# Patient Record
Sex: MALE | Race: WHITE | NOT HISPANIC OR LATINO | Employment: FULL TIME | URBAN - METROPOLITAN AREA
[De-identification: names, ages, dates, MRNs, and addresses within clinical notes are randomized per-mention and may not be internally consistent; named-entity substitution may affect disease eponyms.]

---

## 2017-03-01 RX ORDER — AMLODIPINE BESYLATE 5 MG/1
TABLET ORAL
Qty: 90 TAB | Refills: 1 | Status: SHIPPED | OUTPATIENT
Start: 2017-03-01 | End: 2017-09-03 | Stop reason: SDUPTHER

## 2017-03-01 RX ORDER — LISINOPRIL AND HYDROCHLOROTHIAZIDE 25; 20 MG/1; MG/1
TABLET ORAL
Qty: 90 TAB | Refills: 1 | Status: SHIPPED | OUTPATIENT
Start: 2017-03-01 | End: 2017-09-03 | Stop reason: SDUPTHER

## 2017-03-01 NOTE — TELEPHONE ENCOUNTER
Was the patient seen in the last year in this department? Yes     Does patient have an active prescription for medications requested? No     Received Request Via: Pharmacy      Pt met protocol?: Yes, last ov 10/21/16,   BP Readings from Last 1 Encounters:   10/21/16 118/82

## 2017-04-11 ENCOUNTER — OFFICE VISIT (OUTPATIENT)
Dept: URGENT CARE | Facility: PHYSICIAN GROUP | Age: 52
End: 2017-04-11
Payer: COMMERCIAL

## 2017-04-11 VITALS
HEART RATE: 99 BPM | SYSTOLIC BLOOD PRESSURE: 106 MMHG | BODY MASS INDEX: 32.6 KG/M2 | RESPIRATION RATE: 16 BRPM | WEIGHT: 254 LBS | DIASTOLIC BLOOD PRESSURE: 74 MMHG | OXYGEN SATURATION: 94 % | TEMPERATURE: 98.4 F | HEIGHT: 74 IN

## 2017-04-11 DIAGNOSIS — J02.9 EXUDATIVE PHARYNGITIS: Primary | ICD-10-CM

## 2017-04-11 LAB
INT CON NEG: NEGATIVE
INT CON POS: POSITIVE
S PYO AG THROAT QL: NEGATIVE

## 2017-04-11 PROCEDURE — 87880 STREP A ASSAY W/OPTIC: CPT | Performed by: PHYSICIAN ASSISTANT

## 2017-04-11 PROCEDURE — 99214 OFFICE O/P EST MOD 30 MIN: CPT | Performed by: PHYSICIAN ASSISTANT

## 2017-04-11 RX ORDER — AMOXICILLIN AND CLAVULANATE POTASSIUM 875; 125 MG/1; MG/1
1 TABLET, FILM COATED ORAL 2 TIMES DAILY
Qty: 20 TAB | Refills: 0 | Status: SHIPPED | OUTPATIENT
Start: 2017-04-11 | End: 2017-04-21

## 2017-04-11 NOTE — PATIENT INSTRUCTIONS
"Strep Throat  Strep throat is an infection of the throat caused by a bacteria named Streptococcus pyogenes. Your health care provider may call the infection streptococcal \"tonsillitis\" or \"pharyngitis\" depending on whether there are signs of inflammation in the tonsils or back of the throat. Strep throat is most common in children aged 5-15 years during the cold months of the year, but it can occur in people of any age during any season. This infection is spread from person to person (contagious) through coughing, sneezing, or other close contact.  SIGNS AND SYMPTOMS   · Fever or chills.  · Painful, swollen, red tonsils or throat.  · Pain or difficulty when swallowing.  · White or yellow spots on the tonsils or throat.  · Swollen, tender lymph nodes or \"glands\" of the neck or under the jaw.  · Red rash all over the body (rare).  DIAGNOSIS   Many different infections can cause the same symptoms. A test must be done to confirm the diagnosis so the right treatment can be given. A \"rapid strep test\" can help your health care provider make the diagnosis in a few minutes. If this test is not available, a light swab of the infected area can be used for a throat culture test. If a throat culture test is done, results are usually available in a day or two.  TREATMENT   Strep throat is treated with antibiotic medicine.  HOME CARE INSTRUCTIONS   · Gargle with 1 tsp of salt in 1 cup of warm water, 3-4 times per day or as needed for comfort.  · Family members who also have a sore throat or fever should be tested for strep throat and treated with antibiotics if they have the strep infection.  · Make sure everyone in your household washes their hands well.  · Do not share food, drinking cups, or personal items that could cause the infection to spread to others.  · You may need to eat a soft food diet until your sore throat gets better.  · Drink enough water and fluids to keep your urine clear or pale yellow. This will help prevent " dehydration.  · Get plenty of rest.  · Stay home from school, day care, or work until you have been on antibiotics for 24 hours.  · Take medicines only as directed by your health care provider.  · Take your antibiotic medicine as directed by your health care provider. Finish it even if you start to feel better.  SEEK MEDICAL CARE IF:   · The glands in your neck continue to enlarge.  · You develop a rash, cough, or earache.  · You cough up green, yellow-brown, or bloody sputum.  · You have pain or discomfort not controlled by medicines.  · Your problems seem to be getting worse rather than better.  · You have a fever.  SEEK IMMEDIATE MEDICAL CARE IF:   · You develop any new symptoms such as vomiting, severe headache, stiff or painful neck, chest pain, shortness of breath, or trouble swallowing.  · You develop severe throat pain, drooling, or changes in your voice.  · You develop swelling of the neck, or the skin on the neck becomes red and tender.  · You develop signs of dehydration, such as fatigue, dry mouth, and decreased urination.  · You become increasingly sleepy, or you cannot wake up completely.  MAKE SURE YOU:  · Understand these instructions.  · Will watch your condition.  · Will get help right away if you are not doing well or get worse.     This information is not intended to replace advice given to you by your health care provider. Make sure you discuss any questions you have with your health care provider.     Document Released: 12/15/2001 Document Revised: 01/08/2016 Document Reviewed: 04/11/2016  [employer] Interactive Patient Education ©2016 [employer]

## 2017-04-11 NOTE — PROGRESS NOTES
Subjective:      Pt is a 51 y.o. male who presents with Sore Throat            HPI  PT presents to  clinic today complaining of sore throat, fevers, chills, watery eyes, pressure in ears, cough, fatigue, runny nose. PT denies CP, SOB, NVD, abdominal pain, joint pain. PT states these symptoms began around 2 days ago and that the pt's family has been sick on and off for the last week. Pt has not taken any medications for this condition. PT states the pain is a 7/10 with swallowing, aching in nature and worse at night. The pt's medication list, problem list, and allergies have been evaluated and reviewed during today's visit.      PMH:  Past Medical History   Diagnosis Date   • Allergy    • Anxiety    • GERD (gastroesophageal reflux disease)    • Hypertension    • Hyperlipidemia        PSH:  Past Surgical History   Procedure Laterality Date   • Testicle exploration       undescended testicle, Age 7   • Hernia repair       umbilical hernia as a child       Fam Hx:    family history includes Cancer in his maternal uncle; Diabetes in his mother; Heart Disease in his father, maternal grandfather, mother, and paternal grandfather; Hyperlipidemia in his father, maternal grandfather, mother, and paternal grandfather; Hypertension in his brother, brother, father, maternal grandfather, mother, paternal grandfather, and sister; Lung Disease in his mother and paternal uncle; Psychiatry in his father and paternal grandmother; Stroke in his maternal grandfather.  Family Status   Relation Status Death Age   • Mother  81   • Father  85   • Brother Alive    • Maternal Uncle     • Paternal Uncle     • Maternal Grandfather     • Paternal Grandmother     • Paternal Grandfather     • Sister Alive    • Maternal Aunt Alive    • Maternal Grandmother     • Brother Alive    • Brother Alive        Soc HX:  Social History     Social History   • Marital Status: Single     Spouse  Name: N/A   • Number of Children: N/A   • Years of Education: N/A     Occupational History   •       Social History Main Topics   • Smoking status: Former Smoker -- 0.50 packs/day for 15 years     Types: Cigarettes     Quit date: 01/01/2001   • Smokeless tobacco: Never Used      Comment: avoid all tobacco products   • Alcohol Use: 1.2 oz/week     2 Cans of beer per week      Comment: Cut back, 1-2 beers a week.   • Drug Use: No   • Sexual Activity:     Partners: Female     Birth Control/ Protection: Post-Menopausal      Comment:      Other Topics Concern   • Not on file     Social History Narrative         Medications:    Current outpatient prescriptions:   •  Ibuprofen (ADVIL PO), Take  by mouth., Disp: , Rfl:   •  amoxicillin-clavulanate (AUGMENTIN) 875-125 MG Tab, Take 1 Tab by mouth 2 times a day for 10 days., Disp: 20 Tab, Rfl: 0  •  amlodipine (NORVASC) 5 MG Tab, TAKE ONE TABLET BY MOUTH DAILY, Disp: 90 Tab, Rfl: 1  •  lisinopril-hydrochlorothiazide (PRINZIDE, ZESTORETIC) 20-25 MG per tablet, TAKE ONE TABLET BY MOUTH DAILY, Disp: 90 Tab, Rfl: 1  •  ibuprofen (MOTRIN) 200 MG Tab, Take 200 mg by mouth every 6 hours as needed., Disp: , Rfl:       Allergies:  Review of patient's allergies indicates no known allergies.      ROS  Constitutional: Positive for chills and malaise/fatigue.   HENT: Positive for congestion and sore throat. Negative for ear pain.    Eyes: Negative for blurred vision, double vision and photophobia.   Respiratory: Positive for cough and sputum production. Negative for hemoptysis, shortness of breath and wheezing.    Cardiovascular: Negative for chest pain and palpitations.   Gastrointestinal: Negative for nausea, vomiting, abdominal pain, diarrhea and constipation.   Genitourinary: Negative for dysuria and flank pain.   Musculoskeletal: Negative for falls and myalgias.   Skin: Negative for itching and rash.   Neurological: Positive for headaches. Negative for dizziness  "and tingling.   Endo/Heme/Allergies: Does not bruise/bleed easily.   Psychiatric/Behavioral: Negative for depression. The patient is not nervous/anxious.    All other systems reviewed and are negative.         Objective:     /74 mmHg  Pulse 99  Temp(Src) 36.9 °C (98.4 °F)  Resp 16  Ht 1.88 m (6' 2.02\")  Wt 115.214 kg (254 lb)  BMI 32.60 kg/m2  SpO2 94%     Physical Exam       Constitutional: PT is oriented to person, place, and time. PT appears well-developed and well-nourished. No distress.   HENT:   Head: Normocephalic and atraumatic.   Right Ear: Hearing, tympanic membrane, external ear and ear canal normal.   Left Ear: Hearing, tympanic membrane, external ear and ear canal normal.   Nose: Mucosal edema, rhinorrhea and sinus tenderness present. Right sinus exhibits frontal sinus tenderness. Left sinus exhibits frontal sinus tenderness.   Mouth/Throat: Uvula is midline. Mucous membranes are pale. Posterior oropharyngeal edema and posterior oropharyngeal erythema with exudate on exam.   Eyes: Conjunctivae normal and EOM are normal. Pupils are equal, round, and reactive to light.   Neck: Normal range of motion. Neck supple. No thyromegaly present.   Cardiovascular: Normal rate, regular rhythm, normal heart sounds and intact distal pulses.  Exam reveals no gallop and no friction rub.    No murmur heard.  Pulmonary/Chest: Effort normal and breath sounds normal. No respiratory distress. PT has no wheezes. PT has no rales. PT exhibits no tenderness.   Abdominal: Soft. Bowel sounds are normal. PT exhibits no distension and no mass. There is no tenderness. There is no rebound and no guarding.   Musculoskeletal: Normal range of motion. PT exhibits no edema and no tenderness.   Lymphadenopathy:     PT has no cervical adenopathy.   Neurological: PT is alert and oriented to person, place, and time. PT displays normal reflexes. No cranial nerve deficit. PT exhibits normal muscle tone. Coordination normal.   Skin: " Skin is warm and dry. No rash noted. No erythema.   Psychiatric: PT has a normal mood and affect. PT behavior is normal. Judgment and thought content normal.        Assessment/Plan:     1. Exudative pharyngitis  Back-up abx if symptoms do not improve in 2-3 days. PT on clinical presentation has exudate on oropharynx and it's possible beyond the strep A testing that this could be a different type of strep (C/G) or A. haemolyticum     - POCT Rapid Strep A-->NEG  - amoxicillin-clavulanate (AUGMENTIN) 875-125 MG Tab; Take 1 Tab by mouth 2 times a day for 10 days.  Dispense: 20 Tab; Refill: 0      Rest, fluids encouraged.  OTC decongestant for congestion/cough  Note given for work.  AVS with medical info given.  Pt was in full understanding and agreement with the plan.  Follow-up as needed if symptoms worsen or fail to improve.

## 2017-04-11 NOTE — Clinical Note
April 11, 2017       Patient: Brice Rasmussen   YOB: 1965   Date of Visit: 4/11/2017         To Whom It May Concern:    It is my medical opinion that Brice Rasmussen may be excused from work for the dates of 4/11/17-4/14/17.      If you have any questions or concerns, please don't hesitate to call [phone]          Sincerely,          Chetan Craig PA-C  Electronically Signed

## 2017-04-11 NOTE — MR AVS SNAPSHOT
"Brice Rasmussen   2017 9:25 AM   Office Visit   MRN: [id number]    Department:  [facility]   Dept Phone:  [phone]    Description:  Male : 1965   Provider:  Chetan Craig PA-C           Reason for Visit     Sore Throat C/o sore throat, fever x2 days.  Possible exposure to strep      Allergies as of 2017     No Known Allergies      You were diagnosed with     Exudative pharyngitis   [[id number]]  -  Primary       Vital Signs     Blood Pressure Pulse Temperature Respirations Height Weight    106/74 mmHg 99 36.9 °C (98.4 °F) 16 1.88 m (6' 2.02\") 115.214 kg (254 lb)    Body Mass Index Oxygen Saturation Smoking Status             32.60 kg/m2 94% Former Smoker         Basic Information     Date Of Birth Sex Race Ethnicity Preferred Language    1965 Male White Non- English      Problem List              ICD-10-CM Priority Class Noted - Resolved    Essential hypertension I10   2013 - Present    Obesity (BMI 35.0-39.9 without comorbidity) (HCC) E66.9   2016 - Present    Body mass index 35.0-35.9, adult Z68.35   2016 - Present    Environmental allergies Z91.09   2016 - Present    Vitamin D deficiency E55.9   2016 - Present      Health Maintenance        Date Due Completion Dates    IMM DTaP/Tdap/Td Vaccine (1 - Tdap) 1986 ---            Results     POCT Rapid Strep A      Component    Rapid Strep Screen    Negative    Internal Control Positive    Positive    Internal Control Negative    Negative                        Current Immunizations     No immunizations on file.      Below and/or attached are the medications your provider expects you to take. Review all of your home medications and newly ordered medications with your provider and/or pharmacist. Follow medication instructions as directed by your provider and/or pharmacist. Please keep your medication list with you and share with your provider. Update the information when medications are " discontinued, doses are changed, or new medications (including over-the-counter products) are added; and carry medication information at all times in the event of emergency situations     Allergies:  No Known Allergies          Medications  Valid as of: April 11, 2017 -  9:54 AM    Generic Name Brand Name Tablet Size Instructions for use    AmLODIPine Besylate (Tab) NORVASC 5 MG TAKE ONE TABLET BY MOUTH DAILY        Amoxicillin-Pot Clavulanate (Tab) AUGMENTIN 875-125 MG Take 1 Tab by mouth 2 times a day for 10 days.        Ibuprofen (Tab) MOTRIN 200 MG Take 200 mg by mouth every 6 hours as needed.        Ibuprofen   Take  by mouth.        Lisinopril-Hydrochlorothiazide (Tab) PRINZIDE, ZESTORETIC 20-25 MG TAKE ONE TABLET BY MOUTH DAILY        .                 Medicines prescribed today were sent to:     [facility] #[id number] - [place] [address]    Phone: [phone] Fax: [phone]    Open 24 Hours?: No      Medication refill instructions:       If your prescription bottle indicates you have medication refills left, it is not necessary to call your provider’s office. Please contact your pharmacy and they will refill your medication.    If your prescription bottle indicates you do not have any refills left, you may request refills at any time through one of the following ways: The online [employer] system (except Urgent Care), by calling your provider’s office, or by asking your pharmacy to contact your provider’s office with a refill request. Medication refills are processed only during regular business hours and may not be available until the next business day. Your provider may request additional information or to have a follow-up visit with you prior to refilling your medication.   *Please Note: Medication refills are assigned a new Rx number when refilled electronically. Your pharmacy may indicate that no refills were authorized even though a new prescription for the same  "medication is available at the pharmacy. Please request the medicine by name with the pharmacy before contacting your provider for a refill.        Instructions    Strep Throat  Strep throat is an infection of the throat caused by a bacteria named Streptococcus pyogenes. Your health care provider may call the infection streptococcal \"tonsillitis\" or \"pharyngitis\" depending on whether there are signs of inflammation in the tonsils or back of the throat. Strep throat is most common in children aged 5-15 years during the cold months of the year, but it can occur in people of any age during any season. This infection is spread from person to person (contagious) through coughing, sneezing, or other close contact.  SIGNS AND SYMPTOMS   · Fever or chills.  · Painful, swollen, red tonsils or throat.  · Pain or difficulty when swallowing.  · White or yellow spots on the tonsils or throat.  · Swollen, tender lymph nodes or \"glands\" of the neck or under the jaw.  · Red rash all over the body (rare).  DIAGNOSIS   Many different infections can cause the same symptoms. A test must be done to confirm the diagnosis so the right treatment can be given. A \"rapid strep test\" can help your health care provider make the diagnosis in a few minutes. If this test is not available, a light swab of the infected area can be used for a throat culture test. If a throat culture test is done, results are usually available in a day or two.  TREATMENT   Strep throat is treated with antibiotic medicine.  HOME CARE INSTRUCTIONS   · Gargle with 1 tsp of salt in 1 cup of warm water, 3-4 times per day or as needed for comfort.  · Family members who also have a sore throat or fever should be tested for strep throat and treated with antibiotics if they have the strep infection.  · Make sure everyone in your household washes their hands well.  · Do not share food, drinking cups, or personal items that could cause the infection to spread to others.  · You " may need to eat a soft food diet until your sore throat gets better.  · Drink enough water and fluids to keep your urine clear or pale yellow. This will help prevent dehydration.  · Get plenty of rest.  · Stay home from school, day care, or work until you have been on antibiotics for 24 hours.  · Take medicines only as directed by your health care provider.  · Take your antibiotic medicine as directed by your health care provider. Finish it even if you start to feel better.  SEEK MEDICAL CARE IF:   · The glands in your neck continue to enlarge.  · You develop a rash, cough, or earache.  · You cough up green, yellow-brown, or bloody sputum.  · You have pain or discomfort not controlled by medicines.  · Your problems seem to be getting worse rather than better.  · You have a fever.  SEEK IMMEDIATE MEDICAL CARE IF:   · You develop any new symptoms such as vomiting, severe headache, stiff or painful neck, chest pain, shortness of breath, or trouble swallowing.  · You develop severe throat pain, drooling, or changes in your voice.  · You develop swelling of the neck, or the skin on the neck becomes red and tender.  · You develop signs of dehydration, such as fatigue, dry mouth, and decreased urination.  · You become increasingly sleepy, or you cannot wake up completely.  MAKE SURE YOU:  · Understand these instructions.  · Will watch your condition.  · Will get help right away if you are not doing well or get worse.     This information is not intended to replace advice given to you by your health care provider. Make sure you discuss any questions you have with your health care provider.     Document Released: 12/15/2001 Document Revised: 01/08/2016 Document Reviewed: 04/11/2016  [employer] Interactive Patient Education ©2016 [employer] Inc.            [employer] Access Code: Activation code not generated  Current [employer] Status: Active

## 2017-09-05 RX ORDER — AMLODIPINE BESYLATE 5 MG/1
TABLET ORAL
Qty: 90 TAB | Refills: 0 | Status: SHIPPED | OUTPATIENT
Start: 2017-09-05 | End: 2017-12-01 | Stop reason: SDUPTHER

## 2017-09-05 RX ORDER — LISINOPRIL AND HYDROCHLOROTHIAZIDE 25; 20 MG/1; MG/1
TABLET ORAL
Qty: 90 TAB | Refills: 0 | Status: SHIPPED | OUTPATIENT
Start: 2017-09-05 | End: 2017-12-01 | Stop reason: SDUPTHER

## 2017-09-05 NOTE — TELEPHONE ENCOUNTER
Was the patient seen in the last year in this department? Yes     Does patient have an active prescription for medications requested? No     Received Request Via: Pharmacy      Pt met protocol?: No, OV 10/16   BP Readings from Last 1 Encounters:   04/11/17 106/74

## 2018-02-28 RX ORDER — LISINOPRIL AND HYDROCHLOROTHIAZIDE 25; 20 MG/1; MG/1
TABLET ORAL
Qty: 90 TAB | Refills: 0 | Status: SHIPPED | OUTPATIENT
Start: 2018-02-28 | End: 2018-06-02 | Stop reason: SDUPTHER

## 2018-02-28 RX ORDER — AMLODIPINE BESYLATE 5 MG/1
TABLET ORAL
Qty: 90 TAB | Refills: 0 | Status: SHIPPED | OUTPATIENT
Start: 2018-02-28 | End: 2018-06-02 | Stop reason: SDUPTHER

## 2018-02-28 NOTE — TELEPHONE ENCOUNTER
*Pt has an upcoming appointment on 03/02/17*  Was the patient seen in the last year in this department? Yes     Does patient have an active prescription for medications requested? No     Received Request Via: Pharmacy    Pt met protocol?: Yes     Last OV 10/2016  BP Readings from Last 1 Encounters:   04/11/17 106/74

## 2018-04-04 ENCOUNTER — OFFICE VISIT (OUTPATIENT)
Dept: MEDICAL GROUP | Facility: MEDICAL CENTER | Age: 53
End: 2018-04-04
Payer: COMMERCIAL

## 2018-04-04 VITALS
BODY MASS INDEX: 32.21 KG/M2 | TEMPERATURE: 97.8 F | SYSTOLIC BLOOD PRESSURE: 140 MMHG | WEIGHT: 251 LBS | RESPIRATION RATE: 16 BRPM | HEART RATE: 79 BPM | OXYGEN SATURATION: 98 % | HEIGHT: 74 IN | DIASTOLIC BLOOD PRESSURE: 80 MMHG

## 2018-04-04 DIAGNOSIS — E66.9 OBESITY (BMI 30-39.9): ICD-10-CM

## 2018-04-04 DIAGNOSIS — Z12.11 SCREEN FOR COLON CANCER: ICD-10-CM

## 2018-04-04 DIAGNOSIS — E78.00 PURE HYPERCHOLESTEROLEMIA: ICD-10-CM

## 2018-04-04 DIAGNOSIS — Z00.00 ANNUAL PHYSICAL EXAM: ICD-10-CM

## 2018-04-04 DIAGNOSIS — E55.9 VITAMIN D DEFICIENCY: ICD-10-CM

## 2018-04-04 DIAGNOSIS — R73.01 IMPAIRED FASTING BLOOD SUGAR: ICD-10-CM

## 2018-04-04 DIAGNOSIS — I10 ESSENTIAL HYPERTENSION: ICD-10-CM

## 2018-04-04 PROCEDURE — 99214 OFFICE O/P EST MOD 30 MIN: CPT | Performed by: NURSE PRACTITIONER

## 2018-04-04 ASSESSMENT — PATIENT HEALTH QUESTIONNAIRE - PHQ9: CLINICAL INTERPRETATION OF PHQ2 SCORE: 0

## 2018-04-04 NOTE — ASSESSMENT & PLAN NOTE
Currently taking lisinopril-hctz 20-25mg daily and amlodipine 5mg daily, has remaining refills from previous PCP. Denies headaches, chest pain, dizziness. Was checking blood pressure at home, but recently moved and needs to find it again.

## 2018-04-04 NOTE — PROGRESS NOTES
Brice Rasmussen is a 52 y.o. male here to establish care and discuss the following: Previous patient of Dr. Sanchez.    HPI:    Essential hypertension  Currently taking lisinopril-hctz 20-25mg daily and amlodipine 5mg daily, has remaining refills from previous PCP. Denies headaches, chest pain, dizziness. Was checking blood pressure at home, but recently moved and needs to find it again.     Vitamin D deficiency  No longer taking vitamin D supplement. Has not been checked in the past 2 years.    Pure hypercholesterolemia  He has had slightly elevated LDL cholesterol in the past. Not currently taking a cholesterol medication. He has gained weight and has not maintain the best diet recently. He plans on restarting his weight watchers soon.    Current medicines (including changes today)  Current Outpatient Prescriptions   Medication Sig Dispense Refill   • amLODIPine (NORVASC) 5 MG Tab TAKE ONE TABLET BY MOUTH DAILY 90 Tab 0   • lisinopril-hydrochlorothiazide (PRINZIDE, ZESTORETIC) 20-25 MG per tablet TAKE ONE TABLET BY MOUTH DAILY(GENERIC ZESTORETIC) 90 Tab 0   • Ibuprofen (ADVIL PO) Take  by mouth.     • ibuprofen (MOTRIN) 200 MG Tab Take 200 mg by mouth every 6 hours as needed.       No current facility-administered medications for this visit.      He  has a past medical history of Allergy; Anxiety; GERD (gastroesophageal reflux disease); Hyperlipidemia; and Hypertension. He also has no past medical history of Headache(784.0).  He  has a past surgical history that includes testicle exploration and hernia repair.  Social History   Substance Use Topics   • Smoking status: Former Smoker     Packs/day: 0.50     Years: 15.00     Types: Cigarettes     Quit date: 1/1/2001   • Smokeless tobacco: Never Used      Comment: avoid all tobacco products   • Alcohol use 1.2 oz/week     2 Cans of beer per week      Comment: Cut back, 1-2 beers a week.     Social History     Social History Narrative   • No narrative on file  "    Family History   Problem Relation Age of Onset   • Diabetes Mother    • Heart Disease Mother    • Hypertension Mother    • Hyperlipidemia Mother    • Lung Disease Mother      copd, former smoker   • Psychiatry Father      dementia   • Heart Disease Father    • Hypertension Father    • Hyperlipidemia Father    • Hypertension Brother    • Cancer Maternal Uncle      Prostate   • Lung Disease Paternal Uncle    • Stroke Maternal Grandfather    • Hyperlipidemia Maternal Grandfather    • Hypertension Maternal Grandfather    • Heart Disease Maternal Grandfather    • Psychiatry Paternal Grandmother      Alzheimers   • Heart Disease Paternal Grandfather    • Hypertension Paternal Grandfather    • Hyperlipidemia Paternal Grandfather    • Hypertension Sister    • Hypertension Brother      Family Status   Relation Status   • Mother  at age 81   • Father  at age 85   • Brother Alive   • Maternal Uncle    • Paternal Uncle    • Maternal Grandfather    • Paternal Grandmother    • Paternal Grandfather    • Sister Alive   • Maternal Aunt Alive   • Maternal Grandmother    • Brother Alive   • Brother Alive         ROS  No chest pain, no abdominal pain, no rash.  Positive ROS as per HPI.  All other systems reviewed and are negative      Objective:     Blood pressure 140/80, pulse 79, temperature 36.6 °C (97.8 °F), resp. rate 16, height 1.88 m (6' 2.02\"), weight 113.9 kg (251 lb), SpO2 98 %. Body mass index is 32.21 kg/m².     Physical Exam:    Constitutional: Alert, no distress.  Skin: Warm, dry, good turgor, no rashes in visible areas.  Eye: Equal, round and reactive, conjunctiva clear, lids normal.  ENMT: Lips without lesions, good dentition, oropharynx clear.  Neck: Trachea midline, no masses, no thyromegaly. No cervical or supraclavicular lymphadenopathy.  Respiratory: Unlabored respiratory effort  Cardiovascular: No edema.  Abdomen: Soft, non-tender, no masses, no " hepatosplenomegaly.  Psych: Alert and oriented x3, normal affect and mood.        Assessment and Plan:   The following treatment plan was discussed    1. Annual physical exam  Check labs, follow-up for annual.  - CBC WITH DIFFERENTIAL; Future  - COMP METABOLIC PANEL; Future  - LIPID PROFILE; Future  - TSH WITH REFLEX TO FT4; Future  - VITAMIN D,25 HYDROXY; Future    2. Essential hypertension  Unstable.  Mildly elevated in office today, possibly more related to nerves.  Continue lisinopril-HCTZ and amlodipine daily.  Begin checking blood pressure daily and keep a log to review at follow up.  Check labs.   - CBC WITH DIFFERENTIAL; Future  - COMP METABOLIC PANEL; Future  - TSH WITH REFLEX TO FT4; Future  - MICROALBUMIN CREAT RATIO URINE; Future    3. Impaired fasting blood sugar  Check A1c.  - HEMOGLOBIN A1C; Future    4. Pure hypercholesterolemia  Check lipids.  - LIPID PROFILE; Future    5. Vitamin D deficiency  Check vitamin D.  - VITAMIN D,25 HYDROXY; Future    6. Screen for colon cancer  Referral placed for colonoscopy.  - REFERRAL TO GI FOR COLONOSCOPY    7. Obesity (BMI 30-39.9)  - Patient identified as having weight management issue.  Appropriate orders and counseling given.  - HEMOGLOBIN A1C; Future      Followup: Return in about 4 weeks (around 5/2/2018) for Annual, Hypertension, Cholesterol.    I have placed the below orders and discussed them with an approved delegating provider. The MA is performing the below orders under the direction of Dr. Mitchell

## 2018-04-04 NOTE — ASSESSMENT & PLAN NOTE
He has had slightly elevated LDL cholesterol in the past. Not currently taking a cholesterol medication. He has gained weight and has not maintain the best diet recently. He plans on restarting his weight watchers soon.

## 2018-04-07 ENCOUNTER — HOSPITAL ENCOUNTER (OUTPATIENT)
Dept: LAB | Facility: MEDICAL CENTER | Age: 53
End: 2018-04-07
Attending: NURSE PRACTITIONER
Payer: COMMERCIAL

## 2018-04-07 DIAGNOSIS — R73.01 IMPAIRED FASTING BLOOD SUGAR: ICD-10-CM

## 2018-04-07 DIAGNOSIS — E78.00 PURE HYPERCHOLESTEROLEMIA: ICD-10-CM

## 2018-04-07 DIAGNOSIS — E55.9 VITAMIN D DEFICIENCY: ICD-10-CM

## 2018-04-07 DIAGNOSIS — Z00.00 ANNUAL PHYSICAL EXAM: ICD-10-CM

## 2018-04-07 DIAGNOSIS — E66.9 OBESITY (BMI 30-39.9): ICD-10-CM

## 2018-04-07 DIAGNOSIS — I10 ESSENTIAL HYPERTENSION: ICD-10-CM

## 2018-04-07 LAB
25(OH)D3 SERPL-MCNC: 21 NG/ML (ref 30–100)
ALBUMIN SERPL BCP-MCNC: 4 G/DL (ref 3.2–4.9)
ALBUMIN/GLOB SERPL: 1.2 G/DL
ALP SERPL-CCNC: 44 U/L (ref 30–99)
ALT SERPL-CCNC: 24 U/L (ref 2–50)
ANION GAP SERPL CALC-SCNC: 8 MMOL/L (ref 0–11.9)
AST SERPL-CCNC: 19 U/L (ref 12–45)
BASOPHILS # BLD AUTO: 0.6 % (ref 0–1.8)
BASOPHILS # BLD: 0.04 K/UL (ref 0–0.12)
BILIRUB SERPL-MCNC: 0.7 MG/DL (ref 0.1–1.5)
BUN SERPL-MCNC: 17 MG/DL (ref 8–22)
CALCIUM SERPL-MCNC: 9.9 MG/DL (ref 8.5–10.5)
CHLORIDE SERPL-SCNC: 100 MMOL/L (ref 96–112)
CHOLEST SERPL-MCNC: 188 MG/DL (ref 100–199)
CO2 SERPL-SCNC: 26 MMOL/L (ref 20–33)
CREAT SERPL-MCNC: 0.87 MG/DL (ref 0.5–1.4)
CREAT UR-MCNC: 37.5 MG/DL
EOSINOPHIL # BLD AUTO: 0.15 K/UL (ref 0–0.51)
EOSINOPHIL NFR BLD: 2.2 % (ref 0–6.9)
ERYTHROCYTE [DISTWIDTH] IN BLOOD BY AUTOMATED COUNT: 39.7 FL (ref 35.9–50)
EST. AVERAGE GLUCOSE BLD GHB EST-MCNC: 114 MG/DL
GLOBULIN SER CALC-MCNC: 3.4 G/DL (ref 1.9–3.5)
GLUCOSE SERPL-MCNC: 93 MG/DL (ref 65–99)
HBA1C MFR BLD: 5.6 % (ref 0–5.6)
HCT VFR BLD AUTO: 45.7 % (ref 42–52)
HDLC SERPL-MCNC: 46 MG/DL
HGB BLD-MCNC: 16 G/DL (ref 14–18)
IMM GRANULOCYTES # BLD AUTO: 0.03 K/UL (ref 0–0.11)
IMM GRANULOCYTES NFR BLD AUTO: 0.4 % (ref 0–0.9)
LDLC SERPL CALC-MCNC: 116 MG/DL
LYMPHOCYTES # BLD AUTO: 1.64 K/UL (ref 1–4.8)
LYMPHOCYTES NFR BLD: 23.8 % (ref 22–41)
MCH RBC QN AUTO: 32.4 PG (ref 27–33)
MCHC RBC AUTO-ENTMCNC: 35 G/DL (ref 33.7–35.3)
MCV RBC AUTO: 92.5 FL (ref 81.4–97.8)
MICROALBUMIN UR-MCNC: <0.7 MG/DL
MICROALBUMIN/CREAT UR: NORMAL MG/G (ref 0–30)
MONOCYTES # BLD AUTO: 0.51 K/UL (ref 0–0.85)
MONOCYTES NFR BLD AUTO: 7.4 % (ref 0–13.4)
NEUTROPHILS # BLD AUTO: 4.51 K/UL (ref 1.82–7.42)
NEUTROPHILS NFR BLD: 65.6 % (ref 44–72)
NRBC # BLD AUTO: 0 K/UL
NRBC BLD-RTO: 0 /100 WBC
PLATELET # BLD AUTO: 289 K/UL (ref 164–446)
PMV BLD AUTO: 9.1 FL (ref 9–12.9)
POTASSIUM SERPL-SCNC: 3.7 MMOL/L (ref 3.6–5.5)
PROT SERPL-MCNC: 7.4 G/DL (ref 6–8.2)
RBC # BLD AUTO: 4.94 M/UL (ref 4.7–6.1)
SODIUM SERPL-SCNC: 134 MMOL/L (ref 135–145)
TRIGL SERPL-MCNC: 130 MG/DL (ref 0–149)
TSH SERPL DL<=0.005 MIU/L-ACNC: 1.37 UIU/ML (ref 0.38–5.33)
WBC # BLD AUTO: 6.9 K/UL (ref 4.8–10.8)

## 2018-04-07 PROCEDURE — 80053 COMPREHEN METABOLIC PANEL: CPT

## 2018-04-07 PROCEDURE — 82043 UR ALBUMIN QUANTITATIVE: CPT

## 2018-04-07 PROCEDURE — 82570 ASSAY OF URINE CREATININE: CPT

## 2018-04-07 PROCEDURE — 84443 ASSAY THYROID STIM HORMONE: CPT

## 2018-04-07 PROCEDURE — 36415 COLL VENOUS BLD VENIPUNCTURE: CPT

## 2018-04-07 PROCEDURE — 80061 LIPID PANEL: CPT

## 2018-04-07 PROCEDURE — 82306 VITAMIN D 25 HYDROXY: CPT

## 2018-04-07 PROCEDURE — 85025 COMPLETE CBC W/AUTO DIFF WBC: CPT

## 2018-04-07 PROCEDURE — 83036 HEMOGLOBIN GLYCOSYLATED A1C: CPT

## 2018-04-13 ENCOUNTER — OFFICE VISIT (OUTPATIENT)
Dept: MEDICAL GROUP | Facility: MEDICAL CENTER | Age: 53
End: 2018-04-13
Payer: COMMERCIAL

## 2018-04-13 VITALS
OXYGEN SATURATION: 96 % | TEMPERATURE: 98.7 F | RESPIRATION RATE: 16 BRPM | HEIGHT: 74 IN | SYSTOLIC BLOOD PRESSURE: 124 MMHG | WEIGHT: 251 LBS | BODY MASS INDEX: 32.21 KG/M2 | DIASTOLIC BLOOD PRESSURE: 80 MMHG | HEART RATE: 86 BPM

## 2018-04-13 DIAGNOSIS — E55.9 VITAMIN D DEFICIENCY: ICD-10-CM

## 2018-04-13 DIAGNOSIS — R73.01 IMPAIRED FASTING BLOOD SUGAR: ICD-10-CM

## 2018-04-13 DIAGNOSIS — Z00.00 ANNUAL PHYSICAL EXAM: ICD-10-CM

## 2018-04-13 DIAGNOSIS — I10 ESSENTIAL HYPERTENSION: ICD-10-CM

## 2018-04-13 PROCEDURE — 99396 PREV VISIT EST AGE 40-64: CPT | Performed by: NURSE PRACTITIONER

## 2018-04-13 NOTE — ASSESSMENT & PLAN NOTE
Blood pressure improved in office today. Patient has been taking amlodipine 5 mg daily and lisinopril-hydrochlorothiazide 20-25 milligrams daily as prescribed. He maintains a well-balanced diet and has began increasing his physical activity. Not currently checking blood pressure at home.

## 2018-04-13 NOTE — ASSESSMENT & PLAN NOTE
Vitamin D slightly low on labs at 21. Patient not currently taking a vitamin D supplement or multivitamin.

## 2018-04-13 NOTE — PROGRESS NOTES
Subjective:   Brice Rasmussen is a 52 y.o. male here today for follow-up on labs and annual:    Annual physical exam  Social/Family: , 1 child  Work: [employer]  Diet: well balanced  Caffeine/Energy Drinks/Soda: Coffee and tea 2-3/day, no energy drinks/soda  Exercise: Working on increasing, planning on doing weight watchers again  Stress: Normal life stress  Sleep: 6 hours per night, feels well rested the next day.   Depression/Anxiety Concerns: No concerns      Essential hypertension  Blood pressure improved in office today. Patient has been taking amlodipine 5 mg daily and lisinopril-hydrochlorothiazide 20-25 milligrams daily as prescribed. He maintains a well-balanced diet and has began increasing his physical activity. Not currently checking blood pressure at home.    Vitamin D deficiency  Vitamin D slightly low on labs at 21. Patient not currently taking a vitamin D supplement or multivitamin.    Impaired fasting blood sugar  Fasting glucose normal on labs. A1c 5.6.     Current medicines (including changes today)  Current Outpatient Prescriptions   Medication Sig Dispense Refill   • amLODIPine (NORVASC) 5 MG Tab TAKE ONE TABLET BY MOUTH DAILY 90 Tab 0   • lisinopril-hydrochlorothiazide (PRINZIDE, ZESTORETIC) 20-25 MG per tablet TAKE ONE TABLET BY MOUTH DAILY(GENERIC ZESTORETIC) 90 Tab 0   • Ibuprofen (ADVIL PO) Take  by mouth.     • ibuprofen (MOTRIN) 200 MG Tab Take 200 mg by mouth every 6 hours as needed.       No current facility-administered medications for this visit.      He  has a past medical history of Allergy; Anxiety; GERD (gastroesophageal reflux disease); Hyperlipidemia; and Hypertension. He also has no past medical history of Headache(784.0).    ROS   No chest pain, no shortness of breath, no abdominal pain  Positive ROS as per HPI.  All other systems reviewed and are negative.     Objective:     Blood pressure 124/80, pulse 86, temperature 37.1 °C (98.7 °F), resp.  "rate 16, height 1.88 m (6' 2.02\"), weight 113.9 kg (251 lb), SpO2 96 %. Body mass index is 32.21 kg/m².     Physical Exam:  Constitutional: Alert, no distress.  Skin: Warm, dry, good turgor, no rashes in visible areas.  Eye: Equal, round and reactive, conjunctiva clear, lids normal.  ENMT: Lips without lesions, good dentition, oropharynx clear.  Neck: Trachea midline, no masses, no thyromegaly. No cervical or supraclavicular lymphadenopathy  Respiratory: Unlabored respiratory effort, lungs clear to auscultation, no wheezes, no ronchi.  Cardiovascular: Normal S1, S2, no murmur, no edema.  Abdomen: Soft, non-tender, no masses, no hepatosplenomegaly.  Psych: Alert and oriented x3, normal affect and mood.      Assessment and Plan:   The following treatment plan was discussed    1. Annual physical exam  Healthy 52-year-old male.  Continue medications as prescribed.  Patient and I discussed the importance of lifestyle changes, with particular emphasis on decreasing sugar and carbohydrate intake and increasing plant-based nutrition (for the purposes of weight loss, general health, and prevention of chronic illnesses), as well as regular cardiovascular exercise, proper sleep, and stress management. Patient verbalized understanding.    2. Essential hypertension  Stable.  Continue medications as prescribed.    3. Vitamin D deficiency  Unstable.  Advised starting a multivitamin with vitamin D3 or a vitamin D3 supplement between 801,000 IU daily.    4. Impaired fasting blood sugar  Stable, improved.  A1c 5.6.  Advised reduction in carbohydrate and sugar intake.      Followup: Return in about 1 year (around 4/13/2019), or if symptoms worsen or fail to improve, for Annual.    I have placed the below orders and discussed them with an approved delegating provider. The MA is performing the below orders under the direction of Dr. Mitchell           "

## 2018-04-13 NOTE — ASSESSMENT & PLAN NOTE
Social/Family: , 1 child  Work: [employer]  Diet: well balanced  Caffeine/Energy Drinks/Soda: Coffee and tea 2-3/day, no energy drinks/soda  Exercise: Working on increasing, planning on doing weight watchers again  Stress: Normal life stress  Sleep: 6 hours per night, feels well rested the next day.   Depression/Anxiety Concerns: No concerns

## 2018-06-04 RX ORDER — AMLODIPINE BESYLATE 5 MG/1
TABLET ORAL
Qty: 90 TAB | Refills: 0 | Status: SHIPPED | OUTPATIENT
Start: 2018-06-04 | End: 2018-08-31 | Stop reason: SDUPTHER

## 2018-06-04 RX ORDER — LISINOPRIL AND HYDROCHLOROTHIAZIDE 25; 20 MG/1; MG/1
TABLET ORAL
Qty: 90 TAB | Refills: 0 | Status: SHIPPED | OUTPATIENT
Start: 2018-06-04 | End: 2018-08-31 | Stop reason: SDUPTHER

## 2018-06-04 NOTE — TELEPHONE ENCOUNTER
From: Brice Rasmussen  Sent: 6/2/2018 7:07 AM PDT  Subject: Medication Renewal Request    Brice Rasmussen would like a refill of the following medications:     amLODIPine (NORVASC) 5 MG Tab [Cortez Sanchez, D.O.]     lisinopril-hydrochlorothiazide (PRINZIDE, ZESTORETIC) 20-25 MG per tablet [Cortez Sanchez D.O.]    Preferred pharmacy: [facility] #[id number] - [place], [place] - [address]

## 2019-06-03 RX ORDER — AMLODIPINE BESYLATE 5 MG/1
TABLET ORAL
Qty: 90 TAB | Refills: 0 | Status: SHIPPED | OUTPATIENT
Start: 2019-06-03 | End: 2019-08-23 | Stop reason: SDUPTHER

## 2019-06-03 RX ORDER — LISINOPRIL AND HYDROCHLOROTHIAZIDE 25; 20 MG/1; MG/1
TABLET ORAL
Qty: 90 TAB | Refills: 0 | Status: SHIPPED | OUTPATIENT
Start: 2019-06-03 | End: 2019-08-23 | Stop reason: SDUPTHER

## 2019-06-03 NOTE — TELEPHONE ENCOUNTER
Refill done for 3 months. Patient is due for annual appointment and labs, please schedule.     CATHLEEN Veronica.

## 2019-08-23 ENCOUNTER — OFFICE VISIT (OUTPATIENT)
Dept: MEDICAL GROUP | Facility: MEDICAL CENTER | Age: 54
End: 2019-08-23
Payer: COMMERCIAL

## 2019-08-23 VITALS
HEART RATE: 85 BPM | BODY MASS INDEX: 34.91 KG/M2 | HEIGHT: 74 IN | TEMPERATURE: 99.3 F | OXYGEN SATURATION: 96 % | WEIGHT: 272 LBS | DIASTOLIC BLOOD PRESSURE: 80 MMHG | SYSTOLIC BLOOD PRESSURE: 124 MMHG

## 2019-08-23 DIAGNOSIS — Z12.11 COLON CANCER SCREENING: ICD-10-CM

## 2019-08-23 DIAGNOSIS — I10 ESSENTIAL HYPERTENSION: ICD-10-CM

## 2019-08-23 DIAGNOSIS — R73.01 IMPAIRED FASTING BLOOD SUGAR: ICD-10-CM

## 2019-08-23 DIAGNOSIS — E55.9 VITAMIN D DEFICIENCY: ICD-10-CM

## 2019-08-23 DIAGNOSIS — E66.9 OBESITY (BMI 30-39.9): ICD-10-CM

## 2019-08-23 DIAGNOSIS — E78.00 PURE HYPERCHOLESTEROLEMIA: ICD-10-CM

## 2019-08-23 DIAGNOSIS — Z00.00 ANNUAL PHYSICAL EXAM: ICD-10-CM

## 2019-08-23 PROCEDURE — 90471 IMMUNIZATION ADMIN: CPT | Performed by: FAMILY MEDICINE

## 2019-08-23 PROCEDURE — 90715 TDAP VACCINE 7 YRS/> IM: CPT | Performed by: FAMILY MEDICINE

## 2019-08-23 PROCEDURE — 99396 PREV VISIT EST AGE 40-64: CPT | Mod: 25 | Performed by: FAMILY MEDICINE

## 2019-08-23 RX ORDER — AMLODIPINE BESYLATE 5 MG/1
5 TABLET ORAL DAILY
Qty: 90 TAB | Refills: 3 | Status: SHIPPED | OUTPATIENT
Start: 2019-08-23 | End: 2020-08-17

## 2019-08-23 RX ORDER — LISINOPRIL AND HYDROCHLOROTHIAZIDE 25; 20 MG/1; MG/1
1 TABLET ORAL DAILY
Qty: 90 TAB | Refills: 3 | Status: SHIPPED | OUTPATIENT
Start: 2019-08-23 | End: 2020-08-17

## 2019-08-23 ASSESSMENT — PATIENT HEALTH QUESTIONNAIRE - PHQ9: CLINICAL INTERPRETATION OF PHQ2 SCORE: 0

## 2019-08-23 NOTE — ASSESSMENT & PLAN NOTE
Patient states he has been on the same blood pressure regimen for the last 5 years.  Denies any side effects with current medications.  Blood pressure has been controlled with current regimen.  He is here for his annual visit to refill medications.

## 2019-08-23 NOTE — PROGRESS NOTES
"Subjective:   Brice Rasmussen is a 54 y.o. male here today for annual    Essential hypertension  Patient states he has been on the same blood pressure regimen for the last 5 years.  Denies any side effects with current medications.  Blood pressure has been controlled with current regimen.  He is here for his annual visit to refill medications.    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         Current medicines (including changes today)  Current Outpatient Medications   Medication Sig Dispense Refill   • amLODIPine (NORVASC) 5 MG Tab Take 1 Tab by mouth every day. 90 Tab 3   • lisinopril-hydrochlorothiazide (PRINZIDE, ZESTORETIC) 20-25 MG per tablet Take 1 Tab by mouth every day. 90 Tab 3   • ibuprofen (MOTRIN) 200 MG Tab Take 200 mg by mouth every 6 hours as needed.       No current facility-administered medications for this visit.      He  has a past medical history of Allergy, Anxiety, GERD (gastroesophageal reflux disease), Hyperlipidemia, and Hypertension. He also has no past medical history of Headache(784.0).    ROS   No edema       Objective:     /80 (BP Location: Right arm, Patient Position: Sitting)   Pulse 85   Temp 37.4 °C (99.3 °F)   Ht 1.88 m (6' 2\")   Wt 123.4 kg (272 lb)   SpO2 96%  Body mass index is 34.92 kg/m².   Physical Exam:  Constitutional: Alert, no distress.  Skin: Warm, dry, good turgor, no rashes in visible areas.  Eye: Equal, round and reactive, conjunctiva clear, lids normal.  ENMT: Lips without lesions, good dentition, oropharynx clear.  TMs pearly gray bilaterally.  Neck: Trachea midline, no masses, no thyromegaly. No cervical or supraclavicular lymphadenopathy  Respiratory: Unlabored respiratory effort, lungs clear to auscultation, no wheezes, no ronchi.  Cardiovascular: Normal S1, S2, no murmur, no edema.  Psych: Alert and oriented x3, normal affect and mood.        Assessment and Plan:   The following treatment plan was " discussed    1. Annual physical exam  Advised healthy lifestyle.  Check labs and call with results.  Tetanus updated today.  - CBC WITH DIFFERENTIAL; Future  - Comp Metabolic Panel; Future  - Lipid Profile; Future  - HEMOGLOBIN A1C; Future  - PROSTATE SPECIFIC AG SCREENING; Future  - TESTOSTERONE SERUM; Future  - VITAMIN D,25 HYDROXY; Future  - Tdap Vaccine =>8YO IM    2. Essential hypertension  Controlled.  Refill medication for next year.  Follow-up annually.  - amLODIPine (NORVASC) 5 MG Tab; Take 1 Tab by mouth every day.  Dispense: 90 Tab; Refill: 3  - lisinopril-hydrochlorothiazide (PRINZIDE, ZESTORETIC) 20-25 MG per tablet; Take 1 Tab by mouth every day.  Dispense: 90 Tab; Refill: 3    3. Impaired fasting blood sugar  Check labs and call with results.    4. Pure hypercholesterolemia  Check labs and call with results.    5. Vitamin D deficiency  Check labs and call with results.    6. Obesity (BMI 30-39.9)  - Patient identified as having weight management issue.  Appropriate orders and counseling given.    7. Colon cancer screening  - COLOGUARD (FIT DNA)      Followup: Return in about 1 year (around 8/23/2020) for Annual.

## 2019-12-31 ENCOUNTER — HOSPITAL ENCOUNTER (OUTPATIENT)
Dept: LAB | Facility: MEDICAL CENTER | Age: 54
End: 2019-12-31
Attending: FAMILY MEDICINE
Payer: COMMERCIAL

## 2019-12-31 DIAGNOSIS — Z00.00 ANNUAL PHYSICAL EXAM: ICD-10-CM

## 2019-12-31 LAB
25(OH)D3 SERPL-MCNC: 28 NG/ML (ref 30–100)
ALBUMIN SERPL BCP-MCNC: 4.2 G/DL (ref 3.2–4.9)
ALBUMIN/GLOB SERPL: 1.2 G/DL
ALP SERPL-CCNC: 53 U/L (ref 30–99)
ALT SERPL-CCNC: 28 U/L (ref 2–50)
ANION GAP SERPL CALC-SCNC: 9 MMOL/L (ref 0–11.9)
AST SERPL-CCNC: 19 U/L (ref 12–45)
BASOPHILS # BLD AUTO: 0.7 % (ref 0–1.8)
BASOPHILS # BLD: 0.05 K/UL (ref 0–0.12)
BILIRUB SERPL-MCNC: 0.7 MG/DL (ref 0.1–1.5)
BUN SERPL-MCNC: 19 MG/DL (ref 8–22)
CALCIUM SERPL-MCNC: 9.4 MG/DL (ref 8.5–10.5)
CHLORIDE SERPL-SCNC: 101 MMOL/L (ref 96–112)
CHOLEST SERPL-MCNC: 197 MG/DL (ref 100–199)
CO2 SERPL-SCNC: 29 MMOL/L (ref 20–33)
CREAT SERPL-MCNC: 1.01 MG/DL (ref 0.5–1.4)
EOSINOPHIL # BLD AUTO: 0.23 K/UL (ref 0–0.51)
EOSINOPHIL NFR BLD: 3.3 % (ref 0–6.9)
ERYTHROCYTE [DISTWIDTH] IN BLOOD BY AUTOMATED COUNT: 40.8 FL (ref 35.9–50)
EST. AVERAGE GLUCOSE BLD GHB EST-MCNC: 120 MG/DL
GLOBULIN SER CALC-MCNC: 3.4 G/DL (ref 1.9–3.5)
GLUCOSE SERPL-MCNC: 100 MG/DL (ref 65–99)
HBA1C MFR BLD: 5.8 % (ref 0–5.6)
HCT VFR BLD AUTO: 44.1 % (ref 42–52)
HDLC SERPL-MCNC: 46 MG/DL
HGB BLD-MCNC: 15.3 G/DL (ref 14–18)
IMM GRANULOCYTES # BLD AUTO: 0.01 K/UL (ref 0–0.11)
IMM GRANULOCYTES NFR BLD AUTO: 0.1 % (ref 0–0.9)
LDLC SERPL CALC-MCNC: 132 MG/DL
LYMPHOCYTES # BLD AUTO: 1.81 K/UL (ref 1–4.8)
LYMPHOCYTES NFR BLD: 26.3 % (ref 22–41)
MCH RBC QN AUTO: 32.5 PG (ref 27–33)
MCHC RBC AUTO-ENTMCNC: 34.7 G/DL (ref 33.7–35.3)
MCV RBC AUTO: 93.6 FL (ref 81.4–97.8)
MONOCYTES # BLD AUTO: 0.53 K/UL (ref 0–0.85)
MONOCYTES NFR BLD AUTO: 7.7 % (ref 0–13.4)
NEUTROPHILS # BLD AUTO: 4.26 K/UL (ref 1.82–7.42)
NEUTROPHILS NFR BLD: 61.9 % (ref 44–72)
NRBC # BLD AUTO: 0 K/UL
NRBC BLD-RTO: 0 /100 WBC
PLATELET # BLD AUTO: 275 K/UL (ref 164–446)
PMV BLD AUTO: 9.2 FL (ref 9–12.9)
POTASSIUM SERPL-SCNC: 3.9 MMOL/L (ref 3.6–5.5)
PROT SERPL-MCNC: 7.6 G/DL (ref 6–8.2)
PSA SERPL-MCNC: 0.3 NG/ML (ref 0–4)
RBC # BLD AUTO: 4.71 M/UL (ref 4.7–6.1)
SODIUM SERPL-SCNC: 139 MMOL/L (ref 135–145)
TESTOST SERPL-MCNC: 194 NG/DL (ref 175–781)
TRIGL SERPL-MCNC: 94 MG/DL (ref 0–149)
WBC # BLD AUTO: 6.9 K/UL (ref 4.8–10.8)

## 2019-12-31 PROCEDURE — 84403 ASSAY OF TOTAL TESTOSTERONE: CPT

## 2019-12-31 PROCEDURE — 84153 ASSAY OF PSA TOTAL: CPT

## 2019-12-31 PROCEDURE — 83036 HEMOGLOBIN GLYCOSYLATED A1C: CPT

## 2019-12-31 PROCEDURE — 36415 COLL VENOUS BLD VENIPUNCTURE: CPT

## 2019-12-31 PROCEDURE — 80061 LIPID PANEL: CPT

## 2019-12-31 PROCEDURE — 85025 COMPLETE CBC W/AUTO DIFF WBC: CPT

## 2019-12-31 PROCEDURE — 80053 COMPREHEN METABOLIC PANEL: CPT

## 2019-12-31 PROCEDURE — 82306 VITAMIN D 25 HYDROXY: CPT

## 2020-01-08 ENCOUNTER — OFFICE VISIT (OUTPATIENT)
Dept: MEDICAL GROUP | Facility: MEDICAL CENTER | Age: 55
End: 2020-01-08
Payer: COMMERCIAL

## 2020-01-08 VITALS
OXYGEN SATURATION: 96 % | SYSTOLIC BLOOD PRESSURE: 132 MMHG | HEIGHT: 74 IN | BODY MASS INDEX: 35.29 KG/M2 | TEMPERATURE: 97.3 F | WEIGHT: 275 LBS | HEART RATE: 100 BPM | DIASTOLIC BLOOD PRESSURE: 82 MMHG

## 2020-01-08 DIAGNOSIS — Z12.11 COLON CANCER SCREENING: ICD-10-CM

## 2020-01-08 DIAGNOSIS — E78.00 PURE HYPERCHOLESTEROLEMIA: ICD-10-CM

## 2020-01-08 DIAGNOSIS — R73.03 PREDIABETES: ICD-10-CM

## 2020-01-08 DIAGNOSIS — E66.9 OBESITY (BMI 30-39.9): ICD-10-CM

## 2020-01-08 DIAGNOSIS — K59.00 CONSTIPATION, UNSPECIFIED CONSTIPATION TYPE: ICD-10-CM

## 2020-01-08 PROBLEM — Z00.00 ANNUAL PHYSICAL EXAM: Status: RESOLVED | Noted: 2018-04-13 | Resolved: 2020-01-08

## 2020-01-08 PROCEDURE — 99214 OFFICE O/P EST MOD 30 MIN: CPT | Performed by: FAMILY MEDICINE

## 2020-01-08 NOTE — ASSESSMENT & PLAN NOTE
Having constipation and right lower quadrant pain associated with it. Constipation has been off and on for years, took a laxative yesterday and that improved symptoms. No drinking enough water will cause symptoms to worse. He eats salads with every meal.  Occasionally he takes stool softeners and rarely takes Dulcolax.

## 2020-01-08 NOTE — PROGRESS NOTES
Subjective:   Maryam Rasmussen is a 54 y.o. male here today for constipation    Constipation  Having constipation and right lower quadrant pain associated with it. Constipation has been off and on for years, took a laxative yesterday and that improved symptoms. No drinking enough water will cause symptoms to worse. He eats salads with every meal.  Occasionally he takes stool softeners and rarely takes Dulcolax.    Prediabetes  Patient admits to not eating as well as he has in the past, which has resulted in weight gain.  His A1c has gone up from 5.6 about 1.5 years ago to now 5.8.    Pure hypercholesterolemia  Patient has just recently got back onto weight watchers and is already starting to lose weight.  He also has not been exercising as much as he previously has.  Results for MARYAM RASMUSSEN (MRN [id number]) as of 1/8/2020 08:00   Ref. Range 4/7/2018 07:36 12/31/2019 06:18   Cholesterol,Tot Latest Ref Range: 100 - 199 mg/dL 188 197   Triglycerides Latest Ref Range: 0 - 149 mg/dL 130 94   HDL Latest Ref Range: >=40 mg/dL 46 46   LDL Latest Ref Range: <100 mg/dL 116 (H) 132 (H)       Obesity (BMI 30-39.9)  Patient has restarted weight watchers and has already lost 5 to 10 pounds.         Current medicines (including changes today)  Current Outpatient Medications   Medication Sig Dispense Refill   • amLODIPine (NORVASC) 5 MG Tab Take 1 Tab by mouth every day. 90 Tab 3   • lisinopril-hydrochlorothiazide (PRINZIDE, ZESTORETIC) 20-25 MG per tablet Take 1 Tab by mouth every day. 90 Tab 3   • ibuprofen (MOTRIN) 200 MG Tab Take 200 mg by mouth every 6 hours as needed.       No current facility-administered medications for this visit.      He  has a past medical history of Allergy, Anxiety, GERD (gastroesophageal reflux disease), Hyperlipidemia, and Hypertension. He also has no past medical history of Headache(784.0).    ROS   No fever, no abdominal pain currently       Objective:     /82 (BP Location:  "Right arm, Patient Position: Sitting)   Pulse 100   Temp 36.3 °C (97.3 °F) (Temporal)   Ht 1.88 m (6' 2\")   Wt 124.7 kg (275 lb)   SpO2 96%  Body mass index is 35.31 kg/m².   Physical Exam:  Constitutional: Alert, no distress.  Skin: Warm, dry, good turgor, no rashes in visible areas.  Eye: Equal, round and reactive, conjunctiva clear, lids normal.  Psych: Alert and oriented x3, normal affect and mood.        Assessment and Plan:   The following treatment plan was discussed    1. Constipation, unspecified constipation type  Most likely related to constipation now that symptoms have resolved after laxative.  Advised patient take MiraLAX daily for next couple weeks and then titrate MiraLAX to as needed.  If symptoms gets much worse consider CT scan.    2. Colon cancer screening  - REFERRAL TO GASTROENTEROLOGY    3. Obesity (BMI 30-39.9)  Advised diet and exercise.  Advised patient continue weight watchers.    4. Pure hypercholesterolemia  Advised weight loss, diet and exercise.  Follow-up labs in 6 to 12 months.    5. Prediabetes  Advised weight loss, diet and exercise.  Follow-up labs in 6 to 12 months.      Followup: Return if symptoms worsen or fail to improve.       Total 20 minutes face-to-face time spent with patient, with greater than 50% of the total time discussing patient's issues and symptoms as listed above in assessment and plan, as well as managing coordination of care for future evaluation and treatment.    "

## 2020-01-08 NOTE — ASSESSMENT & PLAN NOTE
Patient admits to not eating as well as he has in the past, which has resulted in weight gain.  His A1c has gone up from 5.6 about 1.5 years ago to now 5.8.

## 2020-01-08 NOTE — ASSESSMENT & PLAN NOTE
Patient has just recently got back onto weight watchers and is already starting to lose weight.  He also has not been exercising as much as he previously has.  Results for SHANTENBANAM MARYAM (MRN [id number]) as of 1/8/2020 08:00   Ref. Range 4/7/2018 07:36 12/31/2019 06:18   Cholesterol,Tot Latest Ref Range: 100 - 199 mg/dL 188 197   Triglycerides Latest Ref Range: 0 - 149 mg/dL 130 94   HDL Latest Ref Range: >=40 mg/dL 46 46   LDL Latest Ref Range: <100 mg/dL 116 (H) 132 (H)

## 2020-02-09 ENCOUNTER — PATIENT MESSAGE (OUTPATIENT)
Dept: MEDICAL GROUP | Facility: MEDICAL CENTER | Age: 55
End: 2020-02-09

## 2020-02-09 DIAGNOSIS — R10.9 ABDOMINAL PAIN, UNSPECIFIED ABDOMINAL LOCATION: ICD-10-CM

## 2020-02-10 NOTE — TELEPHONE ENCOUNTER
From: Brice Rasmussen  To: Ovidio Mitchell M.D.  Sent: 2/9/2020 10:41 AM PST  Subject: Procedure Question    I am still having discomfort in my lower belly. You mentioned maybe a CT scan before the colonoscopy.

## 2020-11-13 DIAGNOSIS — I10 ESSENTIAL HYPERTENSION: ICD-10-CM

## 2020-11-16 RX ORDER — LISINOPRIL AND HYDROCHLOROTHIAZIDE 25; 20 MG/1; MG/1
TABLET ORAL
Qty: 90 TAB | Refills: 0 | Status: SHIPPED | OUTPATIENT
Start: 2020-11-16 | End: 2021-02-11

## 2020-11-16 RX ORDER — AMLODIPINE BESYLATE 5 MG/1
TABLET ORAL
Qty: 90 TAB | Refills: 0 | Status: SHIPPED | OUTPATIENT
Start: 2020-11-16 | End: 2021-02-11

## 2021-02-10 ENCOUNTER — TELEPHONE (OUTPATIENT)
Dept: MEDICAL GROUP | Facility: MEDICAL CENTER | Age: 56
End: 2021-02-10

## 2021-02-10 DIAGNOSIS — E78.00 PURE HYPERCHOLESTEROLEMIA: ICD-10-CM

## 2021-02-10 DIAGNOSIS — Z00.00 ANNUAL PHYSICAL EXAM: ICD-10-CM

## 2021-02-10 DIAGNOSIS — I10 ESSENTIAL HYPERTENSION: ICD-10-CM

## 2021-02-10 DIAGNOSIS — E55.9 VITAMIN D DEFICIENCY: ICD-10-CM

## 2021-02-10 DIAGNOSIS — R73.03 PREDIABETES: ICD-10-CM

## 2021-02-10 NOTE — TELEPHONE ENCOUNTER
VOICEMAIL  1. Caller Name: Brice Rasmussen   Call Back Number: [phone] (home)     2. Message: Pt requesting blood work to complete before his appointment next Wednesday 2/17/21. Please advise.

## 2021-02-11 ENCOUNTER — TELEPHONE (OUTPATIENT)
Dept: MEDICAL GROUP | Facility: MEDICAL CENTER | Age: 56
End: 2021-02-11

## 2021-02-11 NOTE — TELEPHONE ENCOUNTER
Pt notified. He wanted to inform you that he will not be able to get labs done until Tuesday morning for his Wednesday afternoon appointment. He would like to keep appt as he needs refills.

## 2021-02-11 NOTE — TELEPHONE ENCOUNTER
ESTABLISHED PATIENT PRE-VISIT PLANNING     Patient was NOT contacted to complete PVP.     Note: Patient will not be contacted if there is no indication to call.     1.  Reviewed notes from the last few office visits within the medical group: Yes    2.  If any orders were placed at last visit or intended to be done for this visit (i.e. 6 mos follow-up), do we have Results/Consult Notes?         •  Labs - Labs ordered, NOT completed  Note: If patient appointment is for lab review and patient did not complete labs, check with provider if OK to reschedule patient until labs completed.       •  Imaging - Imaging was not ordered at last office visit.       •  Referrals - No referrals were ordered at last office visit.    3. Is this appointment scheduled as a Hospital Follow-Up? No    4.  Immunizations were updated in Epic using Reconcile Outside Information activity? Yes    5.  Patient is due for the following Health Maintenance Topics:   Health Maintenance Due   Topic Date Due   • IMM ZOSTER VACCINES (1 of 2) 08/02/2017   • IMM INFLUENZA (1) 09/01/2020       6.  AHA (Pulse8) form printed for Provider? N/A       Outside information NOT reconciled using the Jacked feature. Per Audrey Parikh, the Jacked feature is down as of 02/09/2021 at 2:00pm. Will reconcile outside information at a later date.        <<-----Click on this checkbox to enter Procedure Dialysis access placement  10/12/2018  14fr Right Femoral Vince SCHMID

## 2021-03-03 ENCOUNTER — HOSPITAL ENCOUNTER (OUTPATIENT)
Dept: LAB | Facility: MEDICAL CENTER | Age: 56
End: 2021-03-03
Attending: NURSE PRACTITIONER
Payer: COMMERCIAL

## 2021-03-03 DIAGNOSIS — E55.9 VITAMIN D DEFICIENCY: ICD-10-CM

## 2021-03-03 DIAGNOSIS — R73.03 PREDIABETES: ICD-10-CM

## 2021-03-03 DIAGNOSIS — I10 ESSENTIAL HYPERTENSION: ICD-10-CM

## 2021-03-03 DIAGNOSIS — Z00.00 ANNUAL PHYSICAL EXAM: ICD-10-CM

## 2021-03-03 DIAGNOSIS — E78.00 PURE HYPERCHOLESTEROLEMIA: ICD-10-CM

## 2021-03-03 LAB
25(OH)D3 SERPL-MCNC: 43 NG/ML (ref 30–100)
ALBUMIN SERPL BCP-MCNC: 4.4 G/DL (ref 3.2–4.9)
ALBUMIN/GLOB SERPL: 1.4 G/DL
ALP SERPL-CCNC: 72 U/L (ref 30–99)
ALT SERPL-CCNC: 25 U/L (ref 2–50)
ANION GAP SERPL CALC-SCNC: 13 MMOL/L (ref 7–16)
AST SERPL-CCNC: 16 U/L (ref 12–45)
BASOPHILS # BLD AUTO: 0.7 % (ref 0–1.8)
BASOPHILS # BLD: 0.05 K/UL (ref 0–0.12)
BILIRUB SERPL-MCNC: 0.5 MG/DL (ref 0.1–1.5)
BUN SERPL-MCNC: 15 MG/DL (ref 8–22)
CALCIUM SERPL-MCNC: 9.4 MG/DL (ref 8.5–10.5)
CHLORIDE SERPL-SCNC: 96 MMOL/L (ref 96–112)
CHOLEST SERPL-MCNC: 213 MG/DL (ref 100–199)
CO2 SERPL-SCNC: 25 MMOL/L (ref 20–33)
CREAT SERPL-MCNC: 0.88 MG/DL (ref 0.5–1.4)
CREAT UR-MCNC: 105.39 MG/DL
EOSINOPHIL # BLD AUTO: 0.23 K/UL (ref 0–0.51)
EOSINOPHIL NFR BLD: 3.4 % (ref 0–6.9)
ERYTHROCYTE [DISTWIDTH] IN BLOOD BY AUTOMATED COUNT: 40.8 FL (ref 35.9–50)
EST. AVERAGE GLUCOSE BLD GHB EST-MCNC: 123 MG/DL
FASTING STATUS PATIENT QL REPORTED: NORMAL
GLOBULIN SER CALC-MCNC: 3.2 G/DL (ref 1.9–3.5)
GLUCOSE SERPL-MCNC: 104 MG/DL (ref 65–99)
HBA1C MFR BLD: 5.9 % (ref 4–5.6)
HCT VFR BLD AUTO: 46.7 % (ref 42–52)
HDLC SERPL-MCNC: 44 MG/DL
HGB BLD-MCNC: 15.9 G/DL (ref 14–18)
IMM GRANULOCYTES # BLD AUTO: 0.02 K/UL (ref 0–0.11)
IMM GRANULOCYTES NFR BLD AUTO: 0.3 % (ref 0–0.9)
LDLC SERPL CALC-MCNC: 148 MG/DL
LYMPHOCYTES # BLD AUTO: 1.56 K/UL (ref 1–4.8)
LYMPHOCYTES NFR BLD: 23.1 % (ref 22–41)
MCH RBC QN AUTO: 31.6 PG (ref 27–33)
MCHC RBC AUTO-ENTMCNC: 34 G/DL (ref 33.7–35.3)
MCV RBC AUTO: 92.8 FL (ref 81.4–97.8)
MICROALBUMIN UR-MCNC: <1.2 MG/DL
MICROALBUMIN/CREAT UR: NORMAL MG/G (ref 0–30)
MONOCYTES # BLD AUTO: 0.57 K/UL (ref 0–0.85)
MONOCYTES NFR BLD AUTO: 8.4 % (ref 0–13.4)
NEUTROPHILS # BLD AUTO: 4.33 K/UL (ref 1.82–7.42)
NEUTROPHILS NFR BLD: 64.1 % (ref 44–72)
NRBC # BLD AUTO: 0 K/UL
NRBC BLD-RTO: 0 /100 WBC
PLATELET # BLD AUTO: 310 K/UL (ref 164–446)
PMV BLD AUTO: 8.9 FL (ref 9–12.9)
POTASSIUM SERPL-SCNC: 3.6 MMOL/L (ref 3.6–5.5)
PROT SERPL-MCNC: 7.6 G/DL (ref 6–8.2)
RBC # BLD AUTO: 5.03 M/UL (ref 4.7–6.1)
SODIUM SERPL-SCNC: 134 MMOL/L (ref 135–145)
TRIGL SERPL-MCNC: 106 MG/DL (ref 0–149)
WBC # BLD AUTO: 6.8 K/UL (ref 4.8–10.8)

## 2021-03-03 PROCEDURE — 82043 UR ALBUMIN QUANTITATIVE: CPT

## 2021-03-03 PROCEDURE — 85025 COMPLETE CBC W/AUTO DIFF WBC: CPT

## 2021-03-03 PROCEDURE — 84153 ASSAY OF PSA TOTAL: CPT

## 2021-03-03 PROCEDURE — 80053 COMPREHEN METABOLIC PANEL: CPT

## 2021-03-03 PROCEDURE — 83036 HEMOGLOBIN GLYCOSYLATED A1C: CPT

## 2021-03-03 PROCEDURE — 82306 VITAMIN D 25 HYDROXY: CPT

## 2021-03-03 PROCEDURE — 80061 LIPID PANEL: CPT

## 2021-03-03 PROCEDURE — 82570 ASSAY OF URINE CREATININE: CPT

## 2021-03-03 PROCEDURE — 84154 ASSAY OF PSA FREE: CPT

## 2021-03-03 PROCEDURE — 36415 COLL VENOUS BLD VENIPUNCTURE: CPT

## 2021-03-05 LAB
PSA FREE MFR SERPL: 25 %
PSA FREE SERPL-MCNC: 0.1 NG/ML
PSA SERPL-MCNC: 0.4 NG/ML (ref 0–4)

## 2021-03-08 ENCOUNTER — OFFICE VISIT (OUTPATIENT)
Dept: MEDICAL GROUP | Facility: MEDICAL CENTER | Age: 56
End: 2021-03-08
Payer: COMMERCIAL

## 2021-03-08 VITALS
BODY MASS INDEX: 35.81 KG/M2 | DIASTOLIC BLOOD PRESSURE: 86 MMHG | SYSTOLIC BLOOD PRESSURE: 152 MMHG | TEMPERATURE: 97.9 F | HEART RATE: 84 BPM | HEIGHT: 74 IN | WEIGHT: 279 LBS | OXYGEN SATURATION: 97 %

## 2021-03-08 DIAGNOSIS — E78.00 PURE HYPERCHOLESTEROLEMIA: ICD-10-CM

## 2021-03-08 DIAGNOSIS — L57.0 ACTINIC KERATOSIS: ICD-10-CM

## 2021-03-08 DIAGNOSIS — R73.03 PREDIABETES: ICD-10-CM

## 2021-03-08 DIAGNOSIS — I10 ESSENTIAL HYPERTENSION: ICD-10-CM

## 2021-03-08 DIAGNOSIS — Z85.828 ENCOUNTER FOR FOLLOW-UP SURVEILLANCE OF SKIN CANCER: ICD-10-CM

## 2021-03-08 DIAGNOSIS — E66.9 OBESITY (BMI 35.0-39.9 WITHOUT COMORBIDITY): ICD-10-CM

## 2021-03-08 DIAGNOSIS — Z82.49 FAMILY HISTORY OF HEART DISEASE: ICD-10-CM

## 2021-03-08 DIAGNOSIS — Z08 ENCOUNTER FOR FOLLOW-UP SURVEILLANCE OF SKIN CANCER: ICD-10-CM

## 2021-03-08 DIAGNOSIS — Z00.00 ANNUAL PHYSICAL EXAM: ICD-10-CM

## 2021-03-08 DIAGNOSIS — I70.90 ATHEROSCLEROTIC PLAQUE: ICD-10-CM

## 2021-03-08 PROCEDURE — 99396 PREV VISIT EST AGE 40-64: CPT | Performed by: NURSE PRACTITIONER

## 2021-03-08 RX ORDER — ATORVASTATIN CALCIUM 10 MG/1
10 TABLET, FILM COATED ORAL EVERY EVENING
Qty: 90 TABLET | Refills: 3 | Status: SHIPPED | OUTPATIENT
Start: 2021-03-08 | End: 2022-02-16

## 2021-03-08 RX ORDER — AMLODIPINE BESYLATE 10 MG/1
10 TABLET ORAL DAILY
Qty: 90 TABLET | Refills: 3 | Status: SHIPPED | OUTPATIENT
Start: 2021-03-08 | End: 2022-02-16

## 2021-03-08 ASSESSMENT — PATIENT HEALTH QUESTIONNAIRE - PHQ9: CLINICAL INTERPRETATION OF PHQ2 SCORE: 0

## 2021-03-08 ASSESSMENT — FIBROSIS 4 INDEX: FIB4 SCORE: 0.55

## 2021-03-08 NOTE — ASSESSMENT & PLAN NOTE
Chronic. LDL continues to rise. Diet had been poor over the past year, gained weight. Restarted weight watchers.     Has a significantly family history of heart disease.    CT abdomen/pelvis in 2015 showed mild atherosclerotic plaque.     The 10-year ASCVD risk score (Nikia ALOK Jr., et al., 2013) is: 8.9%

## 2021-03-08 NOTE — ASSESSMENT & PLAN NOTE
Chronic. Has been elevated, 140s-150s. Not checking at home regularly. Last time this was checked at home was systolic in the 150s.     Denies chest pain, headaches, vision changes.     Taking amlodipine 5 mg daily and lisinopril-hctz 20-25 mg daily.

## 2021-03-08 NOTE — ASSESSMENT & PLAN NOTE
Chronic. Patient admits to not eating as well as he has in the past, which has resulted in weight gain.  His A1c has gone up from 5.8 to 5.9. Recently restarted weight watchers and has lost 7 pounds.

## 2021-03-08 NOTE — PROGRESS NOTES
Subjective:   Brice Rasmussen is a 55 y.o. male here today for Annual, lab review:    Essential hypertension  Chronic. Has been elevated, 140s-150s. Not checking at home regularly. Last time this was checked at home was systolic in the 150s.     Denies chest pain, headaches, vision changes.     Taking amlodipine 5 mg daily and lisinopril-hctz 20-25 mg daily.     Pure hypercholesterolemia  Chronic. LDL continues to rise. Diet had been poor over the past year, gained weight. Restarted weight watchers.     Has a significantly family history of heart disease.    CT abdomen/pelvis in 2015 showed mild atherosclerotic plaque.     The 10-year ASCVD risk score ([place] DICKINSON Jr., et al., 2013) is: 8.9%      Prediabetes  Chronic. Patient admits to not eating as well as he has in the past, which has resulted in weight gain.  His A1c has gone up from 5.8 to 5.9. Recently restarted weight watchers and has lost 7 pounds.        Current medicines (including changes today)  Current Outpatient Medications   Medication Sig Dispense Refill   • amLODIPine (NORVASC) 10 MG Tab Take 1 tablet by mouth every day. 90 tablet 3   • atorvastatin (LIPITOR) 10 MG Tab Take 1 tablet by mouth every evening. 90 tablet 3   • lisinopril-hydrochlorothiazide (PRINZIDE) 20-25 MG per tablet TAKE ONE TABLET BY MOUTH DAILY 90 tablet 0   • ibuprofen (MOTRIN) 200 MG Tab Take 200 mg by mouth every 6 hours as needed.       No current facility-administered medications for this visit.     He  has a past medical history of Allergy, Anxiety, GERD (gastroesophageal reflux disease), Hyperlipidemia, and Hypertension. He also has no past medical history of Headache(784.0).    ROS  No chest pain, no shortness of breath, no abdominal pain  Positive ROS as per HPI.  All other systems reviewed and are negative.     Objective:     /86 (BP Location: Right arm, Patient Position: Sitting, BP Cuff Size: Adult)   Pulse 84   Temp 36.6 °C (97.9 °F) (Temporal)   Ht 1.88 m  "(6' 2\")   Wt (!) 127 kg (279 lb)   SpO2 97%  Body mass index is 35.82 kg/m².     Physical Exam:  Constitutional: Alert, no distress.  Skin: Warm, dry, good turgor, no rashes in visible areas.  Eye: Equal, round and reactive, conjunctiva clear, lids normal.  ENMT Mask in place, TMs WNL bilaterally  Neck: Trachea midline, no masses, no thyromegaly. No cervical or supraclavicular lymphadenopathy  Respiratory: Unlabored respiratory effort, lungs clear to auscultation, no wheezes, no ronchi.  Cardiovascular: Normal S1, S2, no murmur, no edema.  Abdomen: Soft, non-tender, no masses  Psych: Alert and oriented x3, normal affect and mood.      Assessment and Plan:   The following treatment plan was discussed    1. Annual physical exam  Patient and I discussed the importance of lifestyle changes, with particular emphasis on decreasing sugar and carbohydrate intake and increasing plant-based nutrition (for the purposes of weight loss, general health, and prevention of chronic illnesses), as well as regular cardiovascular exercise, proper sleep, and stress management. Patient verbalized understanding.      2. Essential hypertension  Unstable  Continue lisinopril hctz 20-25 mg daily  Increase amlodipine to 10 mg daily  Check BP at home, send readings via [employer]  - amLODIPine (NORVASC) 10 MG Tab; Take 1 tablet by mouth every day.  Dispense: 90 tablet; Refill: 3    3. Prediabetes  Unstable  Diet and exercise  - HEMOGLOBIN A1C; Future    4. Pure hypercholesterolemia  Unstable  Diet and exercise  Start atorvastatin due to plaque on CT and family history of heart disease  - atorvastatin (LIPITOR) 10 MG Tab; Take 1 tablet by mouth every evening.  Dispense: 90 tablet; Refill: 3  - Comp Metabolic Panel; Future  - Lipid Profile; Future    5. Atherosclerotic plaque  - atorvastatin (LIPITOR) 10 MG Tab; Take 1 tablet by mouth every evening.  Dispense: 90 tablet; Refill: 3    6. Family history of heart disease  - atorvastatin (LIPITOR) 10 " MG Tab; Take 1 tablet by mouth every evening.  Dispense: 90 tablet; Refill: 3    7. Encounter for follow-up surveillance of skin cancer  Follow up with derm for skin check  - REFERRAL TO DERMATOLOGY    8. Actinic keratosis  Unstable  Follow up with derm for treatment and monitoring  - REFERRAL TO DERMATOLOGY      Followup: Return in about 6 months (around 9/8/2021) for Hypertension, Cholesterol, Lab Review.    I have placed the below orders and discussed them with an approved delegating provider. The MA is performing the below orders under the direction of Dr. Ybarra

## 2021-05-25 ENCOUNTER — TELEPHONE (OUTPATIENT)
Dept: MEDICAL GROUP | Facility: MEDICAL CENTER | Age: 56
End: 2021-05-25

## 2021-05-25 VITALS — DIASTOLIC BLOOD PRESSURE: 85 MMHG | SYSTOLIC BLOOD PRESSURE: 135 MMHG

## 2021-05-25 NOTE — TELEPHONE ENCOUNTER
Phone Number Called:Lynda Message     Call outcome: Spoke to patient regarding message below.    Message: Abhi Reagan   In an effort to provide quality care and ensure we are following up on your blood pressure, we are requesting you please send us an updated blood pressure reading using your home blood pressure monitor. This will help us keep better track of your blood pressure and give your PCP a quick update on how you are doing:  Tips for taking an accurate blood pressure:   (If you are currently taking blood pressure medication, make sure you have taken them at least 1 hour prior to your blood pressure reading)  1. Ensure the blood pressure cuff is placed on your bare arm with no clothing underneath   2. Sit in an upright position with both of your feet flat on the floor (do not cross legs/ankles)   3. Arm is supported at the heart level (resting on a table/arm chair)   4. Make sure the bottom of blood pressure cuff is just above the bend of the arm   5. Rest for 5 minutes before starting the blood pressure reading   6. Try not to talk/move around while the blood pressure machine is measuring   Thank you so much and we hope to hear from you soon!  Sincerely,

## 2021-09-08 ENCOUNTER — APPOINTMENT (OUTPATIENT)
Dept: MEDICAL GROUP | Facility: MEDICAL CENTER | Age: 56
End: 2021-09-08
Payer: COMMERCIAL

## 2022-02-14 DIAGNOSIS — E78.00 PURE HYPERCHOLESTEROLEMIA: ICD-10-CM

## 2022-02-14 DIAGNOSIS — R73.03 PREDIABETES: ICD-10-CM

## 2022-02-14 DIAGNOSIS — Z00.00 ANNUAL PHYSICAL EXAM: ICD-10-CM

## 2022-02-14 DIAGNOSIS — I70.90 ATHEROSCLEROTIC PLAQUE: ICD-10-CM

## 2022-02-14 DIAGNOSIS — I10 ESSENTIAL HYPERTENSION: ICD-10-CM

## 2022-02-14 DIAGNOSIS — Z82.49 FAMILY HISTORY OF HEART DISEASE: ICD-10-CM

## 2022-02-14 DIAGNOSIS — E55.9 VITAMIN D DEFICIENCY: ICD-10-CM

## 2022-02-16 RX ORDER — AMLODIPINE BESYLATE 10 MG/1
TABLET ORAL
Qty: 90 TABLET | Refills: 0 | Status: SHIPPED | OUTPATIENT
Start: 2022-02-16 | End: 2022-04-28 | Stop reason: SDUPTHER

## 2022-02-16 RX ORDER — ATORVASTATIN CALCIUM 10 MG/1
TABLET, FILM COATED ORAL
Qty: 90 TABLET | Refills: 0 | Status: SHIPPED | OUTPATIENT
Start: 2022-02-16 | End: 2022-04-28 | Stop reason: SDUPTHER

## 2022-02-16 NOTE — TELEPHONE ENCOUNTER
Refill done. Patient is due for annual appointment and labs, labs ordered. Please call and schedule patient.  CATHLEEN Veronica.

## 2022-04-23 LAB
25(OH)D3+25(OH)D2 SERPL-MCNC: 43.7 NG/ML (ref 30–100)
ALBUMIN SERPL-MCNC: 4.5 G/DL (ref 3.8–4.9)
ALBUMIN/CREAT UR: <4 MG/G CREAT (ref 0–29)
ALBUMIN/GLOB SERPL: 1.5 {RATIO} (ref 1.2–2.2)
ALP SERPL-CCNC: 86 IU/L (ref 44–121)
ALT SERPL-CCNC: 41 IU/L (ref 0–44)
AST SERPL-CCNC: 24 IU/L (ref 0–40)
BASOPHILS # BLD AUTO: 0.1 X10E3/UL (ref 0–0.2)
BASOPHILS NFR BLD AUTO: 1 %
BILIRUB SERPL-MCNC: 0.3 MG/DL (ref 0–1.2)
BUN SERPL-MCNC: 17 MG/DL (ref 6–24)
BUN/CREAT SERPL: 14 (ref 9–20)
CALCIUM SERPL-MCNC: 9.6 MG/DL (ref 8.7–10.2)
CHLORIDE SERPL-SCNC: 101 MMOL/L (ref 96–106)
CHOLEST SERPL-MCNC: 158 MG/DL (ref 100–199)
CO2 SERPL-SCNC: 22 MMOL/L (ref 20–29)
CREAT SERPL-MCNC: 1.18 MG/DL (ref 0.76–1.27)
CREAT UR-MCNC: 79.4 MG/DL
EGFRCR SERPLBLD CKD-EPI 2021: 73 ML/MIN/1.73
EOSINOPHIL # BLD AUTO: 0.2 X10E3/UL (ref 0–0.4)
EOSINOPHIL NFR BLD AUTO: 3 %
ERYTHROCYTE [DISTWIDTH] IN BLOOD BY AUTOMATED COUNT: 12.6 % (ref 11.6–15.4)
GLOBULIN SER CALC-MCNC: 3 G/DL (ref 1.5–4.5)
GLUCOSE SERPL-MCNC: 111 MG/DL (ref 65–99)
HBA1C MFR BLD: 5.8 % (ref 4.8–5.6)
HCT VFR BLD AUTO: 47.9 % (ref 37.5–51)
HDLC SERPL-MCNC: 51 MG/DL
HGB BLD-MCNC: 16.3 G/DL (ref 13–17.7)
IMM GRANULOCYTES # BLD AUTO: 0 X10E3/UL (ref 0–0.1)
IMM GRANULOCYTES NFR BLD AUTO: 0 %
IMMATURE CELLS  115398: NORMAL
LABORATORY COMMENT REPORT: NORMAL
LDLC SERPL CALC-MCNC: 94 MG/DL (ref 0–99)
LYMPHOCYTES # BLD AUTO: 1.8 X10E3/UL (ref 0.7–3.1)
LYMPHOCYTES NFR BLD AUTO: 24 %
MCH RBC QN AUTO: 32.3 PG (ref 26.6–33)
MCHC RBC AUTO-ENTMCNC: 34 G/DL (ref 31.5–35.7)
MCV RBC AUTO: 95 FL (ref 79–97)
MICROALBUMIN UR-MCNC: <3 UG/ML
MONOCYTES # BLD AUTO: 0.6 X10E3/UL (ref 0.1–0.9)
MONOCYTES NFR BLD AUTO: 7 %
MORPHOLOGY BLD-IMP: NORMAL
NEUTROPHILS # BLD AUTO: 4.9 X10E3/UL (ref 1.4–7)
NEUTROPHILS NFR BLD AUTO: 65 %
NRBC BLD AUTO-RTO: NORMAL %
PLATELET # BLD AUTO: 314 X10E3/UL (ref 150–450)
POTASSIUM SERPL-SCNC: 4.5 MMOL/L (ref 3.5–5.2)
PROT SERPL-MCNC: 7.5 G/DL (ref 6–8.5)
PSA FREE MFR SERPL: 30 %
PSA FREE SERPL-MCNC: 0.15 NG/ML
PSA SERPL-MCNC: 0.5 NG/ML (ref 0–4)
RBC # BLD AUTO: 5.05 X10E6/UL (ref 4.14–5.8)
SODIUM SERPL-SCNC: 138 MMOL/L (ref 134–144)
TRIGL SERPL-MCNC: 65 MG/DL (ref 0–149)
TSH SERPL DL<=0.005 MIU/L-ACNC: 1.31 UIU/ML (ref 0.45–4.5)
VLDLC SERPL CALC-MCNC: 13 MG/DL (ref 5–40)
WBC # BLD AUTO: 7.5 X10E3/UL (ref 3.4–10.8)

## 2022-04-28 ENCOUNTER — OFFICE VISIT (OUTPATIENT)
Dept: MEDICAL GROUP | Facility: MEDICAL CENTER | Age: 57
End: 2022-04-28
Payer: COMMERCIAL

## 2022-04-28 VITALS
TEMPERATURE: 99.1 F | OXYGEN SATURATION: 97 % | WEIGHT: 283 LBS | HEART RATE: 98 BPM | BODY MASS INDEX: 36.32 KG/M2 | HEIGHT: 74 IN | DIASTOLIC BLOOD PRESSURE: 88 MMHG | SYSTOLIC BLOOD PRESSURE: 130 MMHG

## 2022-04-28 DIAGNOSIS — E66.9 OBESITY (BMI 30-39.9): ICD-10-CM

## 2022-04-28 DIAGNOSIS — E78.00 PURE HYPERCHOLESTEROLEMIA: ICD-10-CM

## 2022-04-28 DIAGNOSIS — I10 ESSENTIAL HYPERTENSION: ICD-10-CM

## 2022-04-28 DIAGNOSIS — I70.90 ATHEROSCLEROTIC PLAQUE: ICD-10-CM

## 2022-04-28 DIAGNOSIS — Z00.00 ANNUAL PHYSICAL EXAM: ICD-10-CM

## 2022-04-28 DIAGNOSIS — Z82.49 FAMILY HISTORY OF HEART DISEASE: ICD-10-CM

## 2022-04-28 DIAGNOSIS — R73.03 PREDIABETES: ICD-10-CM

## 2022-04-28 PROCEDURE — 99396 PREV VISIT EST AGE 40-64: CPT | Performed by: NURSE PRACTITIONER

## 2022-04-28 RX ORDER — LISINOPRIL AND HYDROCHLOROTHIAZIDE 20; 12.5 MG/1; MG/1
2 TABLET ORAL DAILY
Qty: 180 TABLET | Refills: 3 | Status: SHIPPED | OUTPATIENT
Start: 2022-04-28 | End: 2023-04-26 | Stop reason: SDUPTHER

## 2022-04-28 RX ORDER — AMLODIPINE BESYLATE 10 MG/1
10 TABLET ORAL DAILY
Qty: 90 TABLET | Refills: 3 | Status: SHIPPED | OUTPATIENT
Start: 2022-04-28 | End: 2023-04-26 | Stop reason: SDUPTHER

## 2022-04-28 RX ORDER — ATORVASTATIN CALCIUM 10 MG/1
10 TABLET, FILM COATED ORAL EVERY EVENING
Qty: 90 TABLET | Refills: 3 | Status: SHIPPED | OUTPATIENT
Start: 2022-04-28 | End: 2023-04-26 | Stop reason: SDUPTHER

## 2022-04-28 ASSESSMENT — FIBROSIS 4 INDEX: FIB4 SCORE: 0.64

## 2022-04-28 NOTE — ASSESSMENT & PLAN NOTE
Chronic. Cholesterol stable on atorvastatin 10 mg daily.    Has a significantly family history of heart disease.    CT abdomen/pelvis in 2015 showed mild atherosclerotic plaque.     The 10-year ASCVD risk score ([place] DICKINSON Jr., et al., 2013) is: 4.5%

## 2022-04-28 NOTE — PROGRESS NOTES
Subjective:   Brice Rasmussen is a 56 y.o. male here today for annual, lab review    Pure hypercholesterolemia  Chronic. Cholesterol stable on atorvastatin 10 mg daily.    Has a significantly family history of heart disease.    CT abdomen/pelvis in 2015 showed mild atherosclerotic plaque.     The 10-year ASCVD risk score ([place] DICKINSON Jr., et al., 2013) is: 4.5%      Essential hypertension  Chronic. Taking amlodipine 10 mg daily and lisinopril-hctz 20-25 mg daily.     Denies chest pain, headaches, vision changes, dizziness.     Checking BP at home occasionally, running 140s/90s. 130/88 in office.     Does reports that he snores sometimes, occasional headaches, family history of sleep apnea in brother and mother. He was tested for sleep apnea in 2013, was not notified of results, assumed they were normal.     Prediabetes  Chronic.  His A1c is stable at 5.8. Did lose a few pounds recently. Working on diet.        Current medicines (including changes today)  Current Outpatient Medications   Medication Sig Dispense Refill   • lisinopril-hydrochlorothiazide (PRINZIDE) 20-12.5 MG per tablet Take 2 Tablets by mouth every day. 180 Tablet 3   • amLODIPine (NORVASC) 10 MG Tab Take 1 Tablet by mouth every day. 90 Tablet 3   • atorvastatin (LIPITOR) 10 MG Tab Take 1 Tablet by mouth every evening. 90 Tablet 3   • ibuprofen (MOTRIN) 200 MG Tab Take 200 mg by mouth every 6 hours as needed.       No current facility-administered medications for this visit.     He  has a past medical history of Allergy, Anxiety, GERD (gastroesophageal reflux disease), Hyperlipidemia, and Hypertension.    He has no past medical history of Headache(784.0).    ROS   No chest pain, no shortness of breath, no abdominal pain  Positive ROS as per HPI.  All other systems reviewed and are negative.     Objective:     /88 (BP Location: Right arm, Patient Position: Sitting, BP Cuff Size: Large adult)   Pulse 98   Temp 37.3 °C (99.1 °F)  "(Temporal)   Ht 1.88 m (6' 2\")   Wt (!) 128 kg (283 lb)   SpO2 97%  Body mass index is 36.34 kg/m².     Physical Exam:  Constitutional: Alert, no distress.  Skin: Warm, dry, good turgor, no rashes in visible areas.  Eye: Equal, round and reactive, conjunctiva clear, lids normal.  ENMT: Lips without lesions, good dentition, oropharynx clear.  Neck: Trachea midline, no masses, no thyromegaly. No cervical or supraclavicular lymphadenopathy  Respiratory: Unlabored respiratory effort, lungs clear to auscultation, no wheezes, no ronchi.  Cardiovascular: Normal S1, S2, no murmur, no edema.  Abdomen: Soft, non-tender, no masses, no hepatosplenomegaly.  Psych: Alert and oriented x3, normal affect and mood.        Assessment and Plan:   The following treatment plan was discussed    1. Annual physical exam  Patient and I discussed the importance of lifestyle changes, with particular emphasis on decreasing sugar and carbohydrate intake and increasing plant-based nutrition (for the purposes of weight loss, general health, and prevention of chronic illnesses), as well as regular cardiovascular exercise, proper sleep, and stress management. Patient verbalized understanding.  Labs and Health maintenance reviewed and updated     2. Essential hypertension  Unstable  Elevated at home and in office  Increase lisinopril-hctz to 40-25 mg daily, continue amlodipine 10 mg daily  Continue to check BP at home  Reviewed chart, patients past overnight oximetry in 2013 showed likely moderate to severe sleep apnea with 77 desaturation events and low oxygen of 72%.   Will repeat and do OST instead of simple oximetry to see if this is still present, sleep apnea I think is very likely  - lisinopril-hydrochlorothiazide (PRINZIDE) 20-12.5 MG per tablet; Take 2 Tablets by mouth every day.  Dispense: 180 Tablet; Refill: 3  - amLODIPine (NORVASC) 10 MG Tab; Take 1 Tablet by mouth every day.  Dispense: 90 Tablet; Refill: 3    3. " Prediabetes  Stable  Diet, exercise, weight loss advised    4. Pure hypercholesterolemia  Stable  Continue atorvastatin 10 mg daily  - atorvastatin (LIPITOR) 10 MG Tab; Take 1 Tablet by mouth every evening.  Dispense: 90 Tablet; Refill: 3    5. Atherosclerotic plaque  Stable  Continue atorvastatin 10 mg daily  - atorvastatin (LIPITOR) 10 MG Tab; Take 1 Tablet by mouth every evening.  Dispense: 90 Tablet; Refill: 3    6. Family history of heart disease  - atorvastatin (LIPITOR) 10 MG Tab; Take 1 Tablet by mouth every evening.  Dispense: 90 Tablet; Refill: 3    7. Obesity (BMI 30-39.9)  - Patient identified as having weight management issue.  Appropriate orders and counseling given.      Followup: Return in about 3 months (around 7/28/2022) for Hypertension, sleep test.    I have placed the below orders and discussed them with an approved delegating provider. The MA is performing the below orders under the direction of Dr. Ybarra

## 2022-04-28 NOTE — ASSESSMENT & PLAN NOTE
Chronic. Taking amlodipine 10 mg daily and lisinopril-hctz 20-25 mg daily.     Denies chest pain, headaches, vision changes, dizziness.     Checking BP at home occasionally, running 140s/90s. 130/88 in office.     Does reports that he snores sometimes, occasional headaches, family history of sleep apnea in brother and mother. He was tested for sleep apnea in 2013, was not notified of results, assumed they were normal.

## 2022-07-21 ENCOUNTER — APPOINTMENT (OUTPATIENT)
Dept: MEDICAL GROUP | Facility: MEDICAL CENTER | Age: 57
End: 2022-07-21
Payer: COMMERCIAL

## 2022-08-17 ENCOUNTER — OFFICE VISIT (OUTPATIENT)
Dept: MEDICAL GROUP | Facility: MEDICAL CENTER | Age: 57
End: 2022-08-17
Payer: COMMERCIAL

## 2022-08-17 VITALS
SYSTOLIC BLOOD PRESSURE: 128 MMHG | BODY MASS INDEX: 36.5 KG/M2 | OXYGEN SATURATION: 98 % | DIASTOLIC BLOOD PRESSURE: 78 MMHG | HEIGHT: 74 IN | TEMPERATURE: 97.6 F | HEART RATE: 90 BPM | WEIGHT: 284.4 LBS

## 2022-08-17 DIAGNOSIS — I10 ESSENTIAL HYPERTENSION: ICD-10-CM

## 2022-08-17 DIAGNOSIS — G47.33 OBSTRUCTIVE SLEEP APNEA: ICD-10-CM

## 2022-08-17 PROCEDURE — 99214 OFFICE O/P EST MOD 30 MIN: CPT | Performed by: NURSE PRACTITIONER

## 2022-08-17 ASSESSMENT — FIBROSIS 4 INDEX: FIB4 SCORE: 0.66

## 2022-08-17 NOTE — ASSESSMENT & PLAN NOTE
New diagnosis. Recent home sleep test revealed Moderate Obstructive sleep apnea both of the nights test was done with nocturnal hypoxia as low as 71%.     Does reports that he snores sometimes, occasional headaches, family history of sleep apnea in brother and mother. He was tested for sleep apnea in 2013, was not notified of results, assumed they were normal.

## 2022-08-17 NOTE — ASSESSMENT & PLAN NOTE
Chronic. Taking amlodipine 10 mg daily and lisinopril-hctz 20-25 mg 2 tabs daily.     Denies chest pain, headaches, vision changes, dizziness.     Checking BP at home occasionally, running 130/80.

## 2022-08-17 NOTE — PROGRESS NOTES
"Subjective:   Brice Rasmussen is a 57 y.o. male here today for follow up on hypertension and overnight sleep jhonny    Essential hypertension  Chronic. Taking amlodipine 10 mg daily and lisinopril-hctz 20-25 mg 2 tabs daily.     Denies chest pain, headaches, vision changes, dizziness.     Checking BP at home occasionally, running 130/80.     Obstructive sleep apnea  New diagnosis. Recent home sleep test revealed Moderate Obstructive sleep apnea both of the nights test was done with nocturnal hypoxia as low as 71%.     Does reports that he snores sometimes, occasional headaches, family history of sleep apnea in brother and mother. He was tested for sleep apnea in 2013, was not notified of results, assumed they were normal.        Current medicines (including changes today)  Current Outpatient Medications   Medication Sig Dispense Refill    lisinopril-hydrochlorothiazide (PRINZIDE) 20-12.5 MG per tablet Take 2 Tablets by mouth every day. 180 Tablet 3    amLODIPine (NORVASC) 10 MG Tab Take 1 Tablet by mouth every day. 90 Tablet 3    atorvastatin (LIPITOR) 10 MG Tab Take 1 Tablet by mouth every evening. 90 Tablet 3    ibuprofen (MOTRIN) 200 MG Tab Take 200 mg by mouth every 6 hours as needed.       No current facility-administered medications for this visit.     He  has a past medical history of Allergy, Anxiety, GERD (gastroesophageal reflux disease), Hyperlipidemia, and Hypertension.    He has no past medical history of Headache(784.0).    ROS   No chest pain, no shortness of breath, no abdominal pain  Positive ROS as per HPI.  All other systems reviewed and are negative.     Objective:     /78 (BP Location: Right arm, Patient Position: Sitting, BP Cuff Size: Adult)   Pulse 90   Temp 36.4 °C (97.6 °F) (Temporal)   Ht 1.88 m (6' 2\")   Wt (!) 129 kg (284 lb 6.4 oz)   SpO2 98%  Body mass index is 36.51 kg/m².     Physical Exam:  Constitutional: Alert, no distress.  Skin: Warm, dry, good turgor, no " rashes in visible areas.  Eye: Equal, round and reactive, conjunctiva clear, lids normal.  ENMT: Mask in place  Respiratory: Unlabored respiratory effort  Psych: Alert and oriented x3, normal affect and mood.        Assessment and Plan:   The following treatment plan was discussed    1. Essential hypertension  Stable  Continue amlodipine 10 mg daily and lisinopril-HCTZ 40-25 mg daily  Monitor BP more frequently at home, keep log to review  - Referral to Pulmonary and Sleep Medicine    2. Obstructive sleep apnea  Unstable  Follow up with sleep medicine  Advised diet, exercise, and weight loss to improve symptoms  - Referral to Pulmonary and Sleep Medicine      Followup: Return in about 3 months (around 11/17/2022) for Hypertension.    I have placed the below orders and discussed them with an approved delegating provider. The MA is performing the below orders under the direction of Dr. Ybarra

## 2022-11-16 ENCOUNTER — APPOINTMENT (OUTPATIENT)
Dept: MEDICAL GROUP | Facility: MEDICAL CENTER | Age: 57
End: 2022-11-16
Payer: COMMERCIAL

## 2023-05-02 ENCOUNTER — TELEPHONE (OUTPATIENT)
Dept: MEDICAL GROUP | Facility: MEDICAL CENTER | Age: 58
End: 2023-05-02
Payer: COMMERCIAL

## 2023-05-02 DIAGNOSIS — I10 ESSENTIAL HYPERTENSION: ICD-10-CM

## 2023-05-02 DIAGNOSIS — Z00.00 ANNUAL PHYSICAL EXAM: ICD-10-CM

## 2023-05-02 DIAGNOSIS — E78.00 PURE HYPERCHOLESTEROLEMIA: ICD-10-CM

## 2023-05-02 DIAGNOSIS — R73.03 PREDIABETES: ICD-10-CM

## 2023-05-02 DIAGNOSIS — E55.9 VITAMIN D DEFICIENCY: ICD-10-CM

## 2023-05-02 NOTE — TELEPHONE ENCOUNTER
Annual fasting labs ordered, please fax them to patient's preferred lab or have patient come pick them up.     CATHLEEN Veronica.

## 2023-05-04 ENCOUNTER — PATIENT MESSAGE (OUTPATIENT)
Dept: MEDICAL GROUP | Facility: MEDICAL CENTER | Age: 58
End: 2023-05-04
Payer: COMMERCIAL

## 2023-05-04 DIAGNOSIS — E78.00 PURE HYPERCHOLESTEROLEMIA: ICD-10-CM

## 2023-05-04 DIAGNOSIS — Z82.49 FAMILY HISTORY OF HEART DISEASE: ICD-10-CM

## 2023-05-04 DIAGNOSIS — I10 ESSENTIAL HYPERTENSION: ICD-10-CM

## 2023-05-04 DIAGNOSIS — I70.90 ATHEROSCLEROTIC PLAQUE: ICD-10-CM

## 2023-05-10 NOTE — PATIENT COMMUNICATION
Received request via: Patient    Was the patient seen in the last year in this department? Yes    Does the patient have an active prescription (recently filled or refills available) for medication(s) requested? Yes, however, pt insurance no longer covers [facility]. Need to be resent to [facility].   Does the patient have [facility] and need 100 day supply (blood pressure, diabetes and cholesterol meds only)? Patient does not have SCP    Requested Prescriptions     Pending Prescriptions Disp Refills    amLODIPine (NORVASC) 10 MG Tab 90 Tablet 0     Sig: Take 1 Tablet by mouth every day.    atorvastatin (LIPITOR) 10 MG Tab 90 Tablet 0     Sig: Take 1 Tablet by mouth every evening.    lisinopril-hydrochlorothiazide (PRINZIDE) 20-12.5 MG per tablet 180 Tablet 0     Sig: Take 2 Tablets by mouth every day.

## 2023-05-11 LAB
25(OH)D3+25(OH)D2 SERPL-MCNC: 57.2 NG/ML (ref 30–100)
ALBUMIN SERPL-MCNC: 4.5 G/DL (ref 3.8–4.9)
ALBUMIN/CREAT UR: 3 MG/G CREAT (ref 0–29)
ALBUMIN/GLOB SERPL: 1.7 {RATIO} (ref 1.2–2.2)
ALP SERPL-CCNC: 73 IU/L (ref 44–121)
ALT SERPL-CCNC: 36 IU/L (ref 0–44)
AST SERPL-CCNC: 21 IU/L (ref 0–40)
BASOPHILS # BLD AUTO: 0.1 X10E3/UL (ref 0–0.2)
BASOPHILS NFR BLD AUTO: 1 %
BILIRUB SERPL-MCNC: 0.5 MG/DL (ref 0–1.2)
BUN SERPL-MCNC: 14 MG/DL (ref 6–24)
BUN/CREAT SERPL: 14 (ref 9–20)
CALCIUM SERPL-MCNC: 9.8 MG/DL (ref 8.7–10.2)
CHLORIDE SERPL-SCNC: 100 MMOL/L (ref 96–106)
CHOLEST SERPL-MCNC: 152 MG/DL (ref 100–199)
CO2 SERPL-SCNC: 22 MMOL/L (ref 20–29)
CREAT SERPL-MCNC: 1.03 MG/DL (ref 0.76–1.27)
CREAT UR-MCNC: 106.8 MG/DL
EGFRCR SERPLBLD CKD-EPI 2021: 86 ML/MIN/1.73
EOSINOPHIL # BLD AUTO: 0.4 X10E3/UL (ref 0–0.4)
EOSINOPHIL NFR BLD AUTO: 5 %
ERYTHROCYTE [DISTWIDTH] IN BLOOD BY AUTOMATED COUNT: 12.4 % (ref 11.6–15.4)
GLOBULIN SER CALC-MCNC: 2.7 G/DL (ref 1.5–4.5)
GLUCOSE SERPL-MCNC: 105 MG/DL (ref 70–99)
HBA1C MFR BLD: 6 % (ref 4.8–5.6)
HCT VFR BLD AUTO: 46.4 % (ref 37.5–51)
HDLC SERPL-MCNC: 47 MG/DL
HGB BLD-MCNC: 16 G/DL (ref 13–17.7)
IMM GRANULOCYTES # BLD AUTO: 0 X10E3/UL (ref 0–0.1)
IMM GRANULOCYTES NFR BLD AUTO: 0 %
IMMATURE CELLS  115398: NORMAL
LABORATORY COMMENT REPORT: NORMAL
LDLC SERPL CALC-MCNC: 91 MG/DL (ref 0–99)
LYMPHOCYTES # BLD AUTO: 1.7 X10E3/UL (ref 0.7–3.1)
LYMPHOCYTES NFR BLD AUTO: 20 %
MCH RBC QN AUTO: 32 PG (ref 26.6–33)
MCHC RBC AUTO-ENTMCNC: 34.5 G/DL (ref 31.5–35.7)
MCV RBC AUTO: 93 FL (ref 79–97)
MICROALBUMIN UR-MCNC: 3.2 UG/ML
MONOCYTES # BLD AUTO: 0.7 X10E3/UL (ref 0.1–0.9)
MONOCYTES NFR BLD AUTO: 8 %
MORPHOLOGY BLD-IMP: NORMAL
NEUTROPHILS # BLD AUTO: 5.5 X10E3/UL (ref 1.4–7)
NEUTROPHILS NFR BLD AUTO: 66 %
NRBC BLD AUTO-RTO: NORMAL %
PLATELET # BLD AUTO: 355 X10E3/UL (ref 150–450)
POTASSIUM SERPL-SCNC: 3.8 MMOL/L (ref 3.5–5.2)
PROT SERPL-MCNC: 7.2 G/DL (ref 6–8.5)
PSA SERPL-MCNC: 0.6 NG/ML (ref 0–4)
RBC # BLD AUTO: 5 X10E6/UL (ref 4.14–5.8)
SODIUM SERPL-SCNC: 139 MMOL/L (ref 134–144)
TRIGL SERPL-MCNC: 72 MG/DL (ref 0–149)
TSH SERPL DL<=0.005 MIU/L-ACNC: 1.24 UIU/ML (ref 0.45–4.5)
VLDLC SERPL CALC-MCNC: 14 MG/DL (ref 5–40)
WBC # BLD AUTO: 8.2 X10E3/UL (ref 3.4–10.8)

## 2023-05-14 RX ORDER — LISINOPRIL AND HYDROCHLOROTHIAZIDE 20; 12.5 MG/1; MG/1
2 TABLET ORAL DAILY
Qty: 180 TABLET | Refills: 0 | OUTPATIENT
Start: 2023-05-14

## 2023-05-14 RX ORDER — AMLODIPINE BESYLATE 10 MG/1
10 TABLET ORAL DAILY
Qty: 90 TABLET | Refills: 0 | OUTPATIENT
Start: 2023-05-14

## 2023-05-14 RX ORDER — ATORVASTATIN CALCIUM 10 MG/1
10 TABLET, FILM COATED ORAL EVERY EVENING
Qty: 90 TABLET | Refills: 0 | OUTPATIENT
Start: 2023-05-14

## 2023-05-15 ENCOUNTER — OFFICE VISIT (OUTPATIENT)
Dept: MEDICAL GROUP | Facility: MEDICAL CENTER | Age: 58
End: 2023-05-15
Payer: COMMERCIAL

## 2023-05-15 ENCOUNTER — PATIENT MESSAGE (OUTPATIENT)
Dept: MEDICAL GROUP | Facility: MEDICAL CENTER | Age: 58
End: 2023-05-15

## 2023-05-15 VITALS
RESPIRATION RATE: 16 BRPM | BODY MASS INDEX: 35.94 KG/M2 | OXYGEN SATURATION: 97 % | SYSTOLIC BLOOD PRESSURE: 130 MMHG | WEIGHT: 280 LBS | DIASTOLIC BLOOD PRESSURE: 74 MMHG | HEIGHT: 74 IN | TEMPERATURE: 98.4 F | HEART RATE: 84 BPM

## 2023-05-15 DIAGNOSIS — J06.9 VIRAL UPPER RESPIRATORY TRACT INFECTION: ICD-10-CM

## 2023-05-15 DIAGNOSIS — G47.33 OBSTRUCTIVE SLEEP APNEA: ICD-10-CM

## 2023-05-15 DIAGNOSIS — R73.03 PREDIABETES: ICD-10-CM

## 2023-05-15 DIAGNOSIS — I10 ESSENTIAL HYPERTENSION: ICD-10-CM

## 2023-05-15 DIAGNOSIS — E66.01 CLASS 2 SEVERE OBESITY DUE TO EXCESS CALORIES WITH SERIOUS COMORBIDITY AND BODY MASS INDEX (BMI) OF 35.0 TO 35.9 IN ADULT (HCC): ICD-10-CM

## 2023-05-15 PROBLEM — E66.09 OBESITY DUE TO EXCESS CALORIES WITH SERIOUS COMORBIDITY: Status: ACTIVE | Noted: 2023-05-15

## 2023-05-15 PROCEDURE — 99214 OFFICE O/P EST MOD 30 MIN: CPT | Performed by: NURSE PRACTITIONER

## 2023-05-15 PROCEDURE — 3078F DIAST BP <80 MM HG: CPT | Performed by: NURSE PRACTITIONER

## 2023-05-15 PROCEDURE — 3075F SYST BP GE 130 - 139MM HG: CPT | Performed by: NURSE PRACTITIONER

## 2023-05-15 RX ORDER — ASPIRIN 81 MG/1
81 TABLET, CHEWABLE ORAL DAILY
COMMUNITY

## 2023-05-15 SDOH — ECONOMIC STABILITY: FOOD INSECURITY: WITHIN THE PAST 12 MONTHS, YOU WORRIED THAT YOUR FOOD WOULD RUN OUT BEFORE YOU GOT MONEY TO BUY MORE.: PATIENT DECLINED

## 2023-05-15 SDOH — ECONOMIC STABILITY: FOOD INSECURITY: WITHIN THE PAST 12 MONTHS, THE FOOD YOU BOUGHT JUST DIDN'T LAST AND YOU DIDN'T HAVE MONEY TO GET MORE.: PATIENT DECLINED

## 2023-05-15 SDOH — ECONOMIC STABILITY: TRANSPORTATION INSECURITY
IN THE PAST 12 MONTHS, HAS LACK OF RELIABLE TRANSPORTATION KEPT YOU FROM MEDICAL APPOINTMENTS, MEETINGS, WORK OR FROM GETTING THINGS NEEDED FOR DAILY LIVING?: PATIENT DECLINED

## 2023-05-15 SDOH — HEALTH STABILITY: PHYSICAL HEALTH: ON AVERAGE, HOW MANY MINUTES DO YOU ENGAGE IN EXERCISE AT THIS LEVEL?: 30 MIN

## 2023-05-15 SDOH — HEALTH STABILITY: MENTAL HEALTH
STRESS IS WHEN SOMEONE FEELS TENSE, NERVOUS, ANXIOUS, OR CAN'T SLEEP AT NIGHT BECAUSE THEIR MIND IS TROUBLED. HOW STRESSED ARE YOU?: ONLY A LITTLE

## 2023-05-15 SDOH — ECONOMIC STABILITY: TRANSPORTATION INSECURITY
IN THE PAST 12 MONTHS, HAS LACK OF TRANSPORTATION KEPT YOU FROM MEETINGS, WORK, OR FROM GETTING THINGS NEEDED FOR DAILY LIVING?: PATIENT DECLINED

## 2023-05-15 SDOH — HEALTH STABILITY: PHYSICAL HEALTH: ON AVERAGE, HOW MANY DAYS PER WEEK DO YOU ENGAGE IN MODERATE TO STRENUOUS EXERCISE (LIKE A BRISK WALK)?: 2 DAYS

## 2023-05-15 SDOH — ECONOMIC STABILITY: HOUSING INSECURITY

## 2023-05-15 SDOH — ECONOMIC STABILITY: INCOME INSECURITY: IN THE LAST 12 MONTHS, WAS THERE A TIME WHEN YOU WERE NOT ABLE TO PAY THE MORTGAGE OR RENT ON TIME?: PATIENT REFUSED

## 2023-05-15 SDOH — ECONOMIC STABILITY: TRANSPORTATION INSECURITY
IN THE PAST 12 MONTHS, HAS THE LACK OF TRANSPORTATION KEPT YOU FROM MEDICAL APPOINTMENTS OR FROM GETTING MEDICATIONS?: PATIENT DECLINED

## 2023-05-15 SDOH — ECONOMIC STABILITY: HOUSING INSECURITY
IN THE LAST 12 MONTHS, WAS THERE A TIME WHEN YOU DID NOT HAVE A STEADY PLACE TO SLEEP OR SLEPT IN A SHELTER (INCLUDING NOW)?: PATIENT REFUSED

## 2023-05-15 ASSESSMENT — SOCIAL DETERMINANTS OF HEALTH (SDOH)
IN A TYPICAL WEEK, HOW MANY TIMES DO YOU TALK ON THE PHONE WITH FAMILY, FRIENDS, OR NEIGHBORS?: PATIENT DECLINED
HOW OFTEN DO YOU GET TOGETHER WITH FRIENDS OR RELATIVES?: PATIENT DECLINED
HOW OFTEN DO YOU ATTEND CHURCH OR RELIGIOUS SERVICES?: PATIENT DECLINED
WITHIN THE PAST 12 MONTHS, YOU WORRIED THAT YOUR FOOD WOULD RUN OUT BEFORE YOU GOT THE MONEY TO BUY MORE: PATIENT DECLINED
HOW OFTEN DO YOU ATTENT MEETINGS OF THE CLUB OR ORGANIZATION YOU BELONG TO?: PATIENT DECLINED
HOW OFTEN DO YOU ATTENT MEETINGS OF THE CLUB OR ORGANIZATION YOU BELONG TO?: PATIENT DECLINED
HOW OFTEN DO YOU HAVE A DRINK CONTAINING ALCOHOL: 2-3 TIMES A WEEK
HOW OFTEN DO YOU GET TOGETHER WITH FRIENDS OR RELATIVES?: PATIENT DECLINED
HOW OFTEN DO YOU HAVE SIX OR MORE DRINKS ON ONE OCCASION: LESS THAN MONTHLY
IN A TYPICAL WEEK, HOW MANY TIMES DO YOU TALK ON THE PHONE WITH FAMILY, FRIENDS, OR NEIGHBORS?: PATIENT DECLINED
HOW OFTEN DO YOU ATTEND CHURCH OR RELIGIOUS SERVICES?: PATIENT DECLINED
DO YOU BELONG TO ANY CLUBS OR ORGANIZATIONS SUCH AS CHURCH GROUPS UNIONS, FRATERNAL OR ATHLETIC GROUPS, OR SCHOOL GROUPS?: PATIENT DECLINED
DO YOU BELONG TO ANY CLUBS OR ORGANIZATIONS SUCH AS CHURCH GROUPS UNIONS, FRATERNAL OR ATHLETIC GROUPS, OR SCHOOL GROUPS?: PATIENT DECLINED
HOW MANY DRINKS CONTAINING ALCOHOL DO YOU HAVE ON A TYPICAL DAY WHEN YOU ARE DRINKING: 1 OR 2

## 2023-05-15 ASSESSMENT — LIFESTYLE VARIABLES
SKIP TO QUESTIONS 9-10: 0
HOW OFTEN DO YOU HAVE A DRINK CONTAINING ALCOHOL: 2-3 TIMES A WEEK
HOW MANY STANDARD DRINKS CONTAINING ALCOHOL DO YOU HAVE ON A TYPICAL DAY: 1 OR 2
HOW OFTEN DO YOU HAVE SIX OR MORE DRINKS ON ONE OCCASION: LESS THAN MONTHLY
AUDIT-C TOTAL SCORE: 4

## 2023-05-15 ASSESSMENT — FIBROSIS 4 INDEX: FIB4 SCORE: 0.54

## 2023-05-15 ASSESSMENT — PATIENT HEALTH QUESTIONNAIRE - PHQ9: CLINICAL INTERPRETATION OF PHQ2 SCORE: 0

## 2023-05-15 NOTE — PROGRESS NOTES
"Subjective:   Brice Rasmussen is a 57 y.o. male here today for lab review, blood pressure follow up    Essential hypertension  Chronic. Taking amlodipine 10 mg daily and lisinopril-hctz 20-25 mg 2 tabs daily.     Denies chest pain, headaches, vision changes, dizziness.     Checking BP at home every few weeks, running 127-145/70-90s. Usually high 120s-low 130s.     Today in office 130/74.     Has not yet scheduled with sleep specialist for new sleep apnea    Viral upper respiratory tract infection  Ongoing for about a month, having persistent cough, congestion. Taking OTC cold medication as needed.     Not taking nasal sprays. Improving slowly.      Current medicines (including changes today)  Current Outpatient Medications   Medication Sig Dispense Refill    aspirin (ASA) 81 MG Chew Tab chewable tablet Chew 81 mg every day.      lisinopril-hydrochlorothiazide (PRINZIDE) 20-12.5 MG per tablet Take 2 Tablets by mouth every day. 180 Tablet 0    amLODIPine (NORVASC) 10 MG Tab Take 1 Tablet by mouth every day. 90 Tablet 0    atorvastatin (LIPITOR) 10 MG Tab Take 1 Tablet by mouth every evening. 90 Tablet 0     No current facility-administered medications for this visit.     He  has a past medical history of Allergy, Anxiety, GERD (gastroesophageal reflux disease), Hyperlipidemia, and Hypertension.    He has no past medical history of Headache(784.0).    ROS   No chest pain, no shortness of breath, no abdominal pain  Positive ROS as per HPI.  All other systems reviewed and are negative.     Objective:     /74 (Patient Position: Sitting)   Pulse 84   Temp 36.9 °C (98.4 °F) (Temporal)   Resp 16   Ht 1.88 m (6' 2\")   Wt (!) 127 kg (280 lb)   SpO2 97%  Body mass index is 35.95 kg/m².     Physical Exam:  Constitutional: Alert, no distress.  Skin: Warm, dry, good turgor, no rashes in visible areas.  Eye: Equal, round and reactive, conjunctiva clear, lids normal.  ENMT: Lips without lesions, good " dentition  Respiratory: Unlabored respiratory effort  Psych: Alert and oriented x3, normal affect and mood.      Assessment and Plan:   The following treatment plan was discussed    1. Essential hypertension  Unstable  Elevated in office  Continue with weight loss, increase in exercise, diet changes  If BP continues to remain > 120/80    2. Viral upper respiratory tract infection  Unstable  Advised OTC flonase BID for 1 weeks, then daily for another week  Continue with supportive care    3. Class 2 severe obesity due to excess calories with serious comorbidity and body mass index (BMI) of 35.0 to 35.9 in adult (HCC)  - Patient identified as having weight management issue.  Appropriate orders and counseling given.    4. Prediabetes  Unstable  Diet, exercise, weight loss advised        Followup: Return in about 3 months (around 8/15/2023) for Hypertension, prediabetes.    The MA is performing the above orders under the direction of Dr. Ybarra    Please note that this dictation was created using voice recognition software. I have made every reasonable attempt to correct obvious errors, but I expect that there are errors of grammar and possibly content that I did not discover before finalizing the note.

## 2023-05-15 NOTE — ASSESSMENT & PLAN NOTE
Ongoing for about a month, having persistent cough, congestion. Taking OTC cold medication as needed.     Not taking nasal sprays. Improving slowly.

## 2023-05-15 NOTE — ASSESSMENT & PLAN NOTE
Chronic. Taking amlodipine 10 mg daily and lisinopril-hctz 20-25 mg 2 tabs daily.     Denies chest pain, headaches, vision changes, dizziness.     Checking BP at home every few weeks, running 127-145/70-90s. Usually high 120s-low 130s.     Today in office 130/74.     Has not yet scheduled with sleep specialist for new sleep apnea

## 2023-08-17 ENCOUNTER — APPOINTMENT (OUTPATIENT)
Dept: MEDICAL GROUP | Facility: MEDICAL CENTER | Age: 58
End: 2023-08-17
Payer: COMMERCIAL

## 2023-08-28 ENCOUNTER — APPOINTMENT (OUTPATIENT)
Dept: MEDICAL GROUP | Facility: MEDICAL CENTER | Age: 58
End: 2023-08-28
Payer: COMMERCIAL

## 2023-09-07 ENCOUNTER — APPOINTMENT (OUTPATIENT)
Dept: MEDICAL GROUP | Facility: MEDICAL CENTER | Age: 58
End: 2023-09-07
Payer: COMMERCIAL

## 2023-09-20 ENCOUNTER — OFFICE VISIT (OUTPATIENT)
Dept: MEDICAL GROUP | Facility: MEDICAL CENTER | Age: 58
End: 2023-09-20
Payer: COMMERCIAL

## 2023-09-20 VITALS
HEIGHT: 73 IN | HEART RATE: 87 BPM | TEMPERATURE: 97 F | SYSTOLIC BLOOD PRESSURE: 118 MMHG | DIASTOLIC BLOOD PRESSURE: 60 MMHG | BODY MASS INDEX: 38.04 KG/M2 | OXYGEN SATURATION: 98 % | WEIGHT: 287 LBS

## 2023-09-20 DIAGNOSIS — Z23 NEED FOR VACCINATION: ICD-10-CM

## 2023-09-20 DIAGNOSIS — M25.562 ACUTE PAIN OF LEFT KNEE: ICD-10-CM

## 2023-09-20 PROCEDURE — 99213 OFFICE O/P EST LOW 20 MIN: CPT | Mod: 25 | Performed by: NURSE PRACTITIONER

## 2023-09-20 PROCEDURE — 90686 IIV4 VACC NO PRSV 0.5 ML IM: CPT | Performed by: NURSE PRACTITIONER

## 2023-09-20 PROCEDURE — 3074F SYST BP LT 130 MM HG: CPT | Performed by: NURSE PRACTITIONER

## 2023-09-20 PROCEDURE — 90471 IMMUNIZATION ADMIN: CPT | Performed by: NURSE PRACTITIONER

## 2023-09-20 PROCEDURE — 3078F DIAST BP <80 MM HG: CPT | Performed by: NURSE PRACTITIONER

## 2023-09-20 ASSESSMENT — FIBROSIS 4 INDEX: FIB4 SCORE: 0.55

## 2023-09-20 NOTE — ASSESSMENT & PLAN NOTE
Ongoing for about a week. Was fishing prior to this and was doing more walking at that time. Has been taking ibuprofen 400 mg as needed, wearing brace. Feeling a lot better today. Today he noticed his foot feeling numb.

## 2023-09-21 NOTE — PROGRESS NOTES
"Subjective:   Brice Rasmussen is a 58 y.o. male here today for knee pain    Acute pain of left knee  Ongoing for about a week. Was fishing prior to this and was doing more walking at that time. Has been taking ibuprofen 400 mg as needed, wearing brace. Feeling a lot better today. Today he noticed his foot feeling numb.      Current medicines (including changes today)  Current Outpatient Medications   Medication Sig Dispense Refill    lisinopril-hydrochlorothiazide (PRINZIDE) 20-12.5 MG per tablet Take 2 Tablets by mouth every day. 180 Tablet 3    amLODIPine (NORVASC) 10 MG Tab Take 1 Tablet by mouth every day. 90 Tablet 3    atorvastatin (LIPITOR) 10 MG Tab Take 1 Tablet by mouth every evening. 90 Tablet 3    aspirin (ASA) 81 MG Chew Tab chewable tablet Chew 81 mg every day.       No current facility-administered medications for this visit.     He  has a past medical history of Allergy, Anxiety, GERD (gastroesophageal reflux disease), Hyperlipidemia, and Hypertension.    He has no past medical history of Headache(784.0).    ROS   No chest pain, no shortness of breath, no abdominal pain  Positive ROS as per HPI.  All other systems reviewed and are negative.     Objective:     /60   Pulse 87   Temp 36.1 °C (97 °F) (Temporal)   Ht 1.854 m (6' 1\")   Wt (!) 130 kg (287 lb)   SpO2 98%  Body mass index is 37.87 kg/m².   Physical Exam:  Constitutional: Alert, no distress.  Skin: Warm, dry, good turgor, no rashes in visible areas.  Eye: Equal, round and reactive, conjunctiva clear, lids normal.  ENMT: Lips without lesions, good dentition  Respiratory: Unlabored respiratory effort  Psych: Alert and oriented x3, normal affect and mood.  Left knee: Joint visibly larger than right knee.  Tenderness to palpation of left medial aspect.  No laxity, no patellar tendon tenderness      Assessment and Plan:   The following treatment plan was discussed    1. Acute pain of left knee  Unstable, improving  Continue " with rest, ice, bracing   Increase dose of ibuprofen to 600 or 800 mg twice daily  We will be going on another fishing trip soon, advised that he wear his knee brace when exerting himself.  If pain has not improved or worsens, check x-ray  - DX-KNEE 3 VIEWS Atraumatic Pain/Swelling/Deformity; Future    2. Need for vaccination  - INFLUENZA VACCINE QUAD INJ (PF)      Followup: Return for Care As Needed.    The MA is performing the above orders under the direction of Dr. Ybarra    Please note that this dictation was created using voice recognition software. I have made every reasonable attempt to correct obvious errors, but I expect that there are errors of grammar and possibly content that I did not discover before finalizing the note.

## 2023-09-28 ENCOUNTER — HOSPITAL ENCOUNTER (OUTPATIENT)
Dept: RADIOLOGY | Facility: MEDICAL CENTER | Age: 58
End: 2023-09-28
Attending: NURSE PRACTITIONER
Payer: COMMERCIAL

## 2023-09-28 DIAGNOSIS — M25.562 ACUTE PAIN OF LEFT KNEE: ICD-10-CM

## 2023-09-28 PROCEDURE — 73562 X-RAY EXAM OF KNEE 3: CPT | Mod: LT

## 2023-10-09 ENCOUNTER — APPOINTMENT (OUTPATIENT)
Dept: MEDICAL GROUP | Facility: MEDICAL CENTER | Age: 58
End: 2023-10-09
Payer: COMMERCIAL

## 2023-11-20 ENCOUNTER — OFFICE VISIT (OUTPATIENT)
Dept: MEDICAL GROUP | Facility: MEDICAL CENTER | Age: 58
End: 2023-11-20
Payer: COMMERCIAL

## 2023-11-20 ENCOUNTER — APPOINTMENT (OUTPATIENT)
Dept: RADIOLOGY | Facility: MEDICAL CENTER | Age: 58
End: 2023-11-20
Attending: NURSE PRACTITIONER
Payer: COMMERCIAL

## 2023-11-20 VITALS
SYSTOLIC BLOOD PRESSURE: 132 MMHG | DIASTOLIC BLOOD PRESSURE: 68 MMHG | WEIGHT: 291 LBS | OXYGEN SATURATION: 97 % | HEIGHT: 73 IN | TEMPERATURE: 97 F | BODY MASS INDEX: 38.57 KG/M2 | HEART RATE: 92 BPM

## 2023-11-20 DIAGNOSIS — M79.89 LEFT LEG SWELLING: ICD-10-CM

## 2023-11-20 DIAGNOSIS — R73.03 PREDIABETES: ICD-10-CM

## 2023-11-20 PROCEDURE — 93971 EXTREMITY STUDY: CPT | Mod: LT

## 2023-11-20 PROCEDURE — 3078F DIAST BP <80 MM HG: CPT | Performed by: NURSE PRACTITIONER

## 2023-11-20 PROCEDURE — 99214 OFFICE O/P EST MOD 30 MIN: CPT | Performed by: NURSE PRACTITIONER

## 2023-11-20 PROCEDURE — 3075F SYST BP GE 130 - 139MM HG: CPT | Performed by: NURSE PRACTITIONER

## 2023-11-20 ASSESSMENT — FIBROSIS 4 INDEX: FIB4 SCORE: 0.55

## 2023-11-20 NOTE — ASSESSMENT & PLAN NOTE
Started about a week ago, went to [place] to help his friends. No injury or trauma that he known. He drove to and from [place]. Was going up and down ladder for 6 days, down on knees.     Does have some arthritis in left knee but not significantly painful.     No history of blood clots, takes ASA 81 mg daily. No family history blood clots.     Having pain in back of calf, sore to touch. Denies chest pain, SOB.

## 2023-11-20 NOTE — PROGRESS NOTES
"Subjective:   Brice Rasmussen is a 58 y.o. male here today for leg swelling    Left leg swelling  Started about a week ago, went to [place] to help his friends. No injury or trauma that he known. He drove to and from [place]. Was going up and down ladder for 6 days, down on knees.     Does have some arthritis in left knee but not significantly painful.     No history of blood clots, takes ASA 81 mg daily. No family history blood clots.     Having pain in back of calf, sore to touch. Denies chest pain, SOB.      Current medicines (including changes today)  Current Outpatient Medications   Medication Sig Dispense Refill    lisinopril-hydrochlorothiazide (PRINZIDE) 20-12.5 MG per tablet Take 2 Tablets by mouth every day. 180 Tablet 3    amLODIPine (NORVASC) 10 MG Tab Take 1 Tablet by mouth every day. 90 Tablet 3    atorvastatin (LIPITOR) 10 MG Tab Take 1 Tablet by mouth every evening. 90 Tablet 3    aspirin (ASA) 81 MG Chew Tab chewable tablet Chew 81 mg every day.       No current facility-administered medications for this visit.     He  has a past medical history of Allergy, Anxiety, GERD (gastroesophageal reflux disease), Hyperlipidemia, and Hypertension.    He has no past medical history of Headache(784.0).    ROS   No chest pain, no shortness of breath, no abdominal pain  Positive ROS as per HPI.  All other systems reviewed and are negative.     Objective:     /68   Pulse 92   Temp 36.1 °C (97 °F) (Temporal)   Ht 1.854 m (6' 1\")   Wt (!) 132 kg (291 lb)   SpO2 97%  Body mass index is 38.39 kg/m².   Physical Exam:  Constitutional: Alert, no distress.  Skin: Warm, dry, good turgor, no rashes in visible areas.  Eye: Equal, round and reactive, conjunctiva clear, lids normal.  ENMT: Lips without lesions, good dentition  Respiratory: Unlabored respiratory effort, lungs clear to auscultation, no wheezes, no ronchi.  Cardiovascular: Normal S1, S2, no murmur, 3+ pitting LLE edema, trace pitting RLE " edema  Psych: Alert and oriented x3, normal affect and mood.        Assessment and Plan:   The following treatment plan was discussed    1. Left leg swelling  Unstable  DDX includes DVT, venous insufficiency, OA flare  Stat LE US ordered, scheduled this afternoon  If negative for DVT, he will get labs done  Strict ER precautions for red flag symptoms discussed  Labs done 6 months ago with no concerns, he is up to date on all cancer screenings.   - US-EXTREMITY VENOUS LOWER UNILAT LEFT; Future  - CBC WITH DIFFERENTIAL; Future  - Comp Metabolic Panel; Future  - TSH WITH REFLEX TO FT4; Future  - HEMOGLOBIN A1C; Future  - proBrain Natriuretic Peptide, NT; Future    2. Prediabetes  - HEMOGLOBIN A1C; Future      Followup: Return for pending results.    The MA is performing the above orders under the direction of Dr. Ybarra    Please note that this dictation was created using voice recognition software. I have made every reasonable attempt to correct obvious errors, but I expect that there are errors of grammar and possibly content that I did not discover before finalizing the note.

## 2023-11-24 LAB
ALBUMIN SERPL-MCNC: 4.5 G/DL (ref 3.8–4.9)
ALBUMIN/GLOB SERPL: 1.5 {RATIO} (ref 1.2–2.2)
ALP SERPL-CCNC: 75 IU/L (ref 44–121)
ALT SERPL-CCNC: 34 IU/L (ref 0–44)
AST SERPL-CCNC: 21 IU/L (ref 0–40)
BASOPHILS # BLD AUTO: 0 X10E3/UL (ref 0–0.2)
BASOPHILS NFR BLD AUTO: 1 %
BILIRUB SERPL-MCNC: 0.5 MG/DL (ref 0–1.2)
BUN SERPL-MCNC: 17 MG/DL (ref 6–24)
BUN/CREAT SERPL: 17 (ref 9–20)
CALCIUM SERPL-MCNC: 9.8 MG/DL (ref 8.7–10.2)
CHLORIDE SERPL-SCNC: 99 MMOL/L (ref 96–106)
CO2 SERPL-SCNC: 25 MMOL/L (ref 20–29)
CREAT SERPL-MCNC: 0.99 MG/DL (ref 0.76–1.27)
EGFRCR SERPLBLD CKD-EPI 2021: 89 ML/MIN/1.73
EOSINOPHIL # BLD AUTO: 0.2 X10E3/UL (ref 0–0.4)
EOSINOPHIL NFR BLD AUTO: 3 %
ERYTHROCYTE [DISTWIDTH] IN BLOOD BY AUTOMATED COUNT: 12.2 % (ref 11.6–15.4)
GLOBULIN SER CALC-MCNC: 3.1 G/DL (ref 1.5–4.5)
GLUCOSE SERPL-MCNC: 126 MG/DL (ref 70–99)
HBA1C MFR BLD: 6.2 % (ref 4.8–5.6)
HCT VFR BLD AUTO: 44 % (ref 37.5–51)
HGB BLD-MCNC: 15 G/DL (ref 13–17.7)
IMM GRANULOCYTES # BLD AUTO: 0 X10E3/UL (ref 0–0.1)
IMM GRANULOCYTES NFR BLD AUTO: 0 %
IMMATURE CELLS  115398: NORMAL
LYMPHOCYTES # BLD AUTO: 1.6 X10E3/UL (ref 0.7–3.1)
LYMPHOCYTES NFR BLD AUTO: 27 %
MCH RBC QN AUTO: 32.1 PG (ref 26.6–33)
MCHC RBC AUTO-ENTMCNC: 34.1 G/DL (ref 31.5–35.7)
MCV RBC AUTO: 94 FL (ref 79–97)
MONOCYTES # BLD AUTO: 0.4 X10E3/UL (ref 0.1–0.9)
MONOCYTES NFR BLD AUTO: 7 %
MORPHOLOGY BLD-IMP: NORMAL
NEUTROPHILS # BLD AUTO: 3.8 X10E3/UL (ref 1.4–7)
NEUTROPHILS NFR BLD AUTO: 62 %
NRBC BLD AUTO-RTO: NORMAL %
NT-PROBNP SERPL-MCNC: <36 PG/ML (ref 0–210)
PLATELET # BLD AUTO: 354 X10E3/UL (ref 150–450)
POTASSIUM SERPL-SCNC: 4.1 MMOL/L (ref 3.5–5.2)
PROT SERPL-MCNC: 7.6 G/DL (ref 6–8.5)
RBC # BLD AUTO: 4.67 X10E6/UL (ref 4.14–5.8)
SODIUM SERPL-SCNC: 137 MMOL/L (ref 134–144)
TSH SERPL DL<=0.005 MIU/L-ACNC: 1.35 UIU/ML (ref 0.45–4.5)
WBC # BLD AUTO: 6.1 X10E3/UL (ref 3.4–10.8)

## 2024-01-04 ENCOUNTER — TELEPHONE (OUTPATIENT)
Dept: MEDICAL GROUP | Facility: MEDICAL CENTER | Age: 59
End: 2024-01-04
Payer: COMMERCIAL

## 2024-01-04 NOTE — TELEPHONE ENCOUNTER
----- Message from AB Veronica sent at 1/2/2024  4:07 PM PST -----  Please schedule patient for follow up appointment to discuss lab results. This is not urgent.   AB Veronica

## 2024-01-22 ENCOUNTER — APPOINTMENT (OUTPATIENT)
Dept: MEDICAL GROUP | Facility: MEDICAL CENTER | Age: 59
End: 2024-01-22
Payer: COMMERCIAL

## 2024-07-26 DIAGNOSIS — I70.90 ATHEROSCLEROTIC PLAQUE: ICD-10-CM

## 2024-07-26 DIAGNOSIS — Z00.00 ANNUAL PHYSICAL EXAM: ICD-10-CM

## 2024-07-26 DIAGNOSIS — Z82.49 FAMILY HISTORY OF HEART DISEASE: ICD-10-CM

## 2024-07-26 DIAGNOSIS — E55.9 VITAMIN D DEFICIENCY: ICD-10-CM

## 2024-07-26 DIAGNOSIS — I10 ESSENTIAL HYPERTENSION: ICD-10-CM

## 2024-07-26 DIAGNOSIS — E78.00 PURE HYPERCHOLESTEROLEMIA: ICD-10-CM

## 2024-07-26 DIAGNOSIS — Z11.59 NEED FOR HEPATITIS C SCREENING TEST: ICD-10-CM

## 2024-07-26 DIAGNOSIS — Z11.4 ENCOUNTER FOR SCREENING FOR HIV: ICD-10-CM

## 2024-07-26 DIAGNOSIS — R73.03 PREDIABETES: ICD-10-CM

## 2024-07-28 RX ORDER — AMLODIPINE BESYLATE 10 MG/1
10 TABLET ORAL DAILY
Qty: 90 TABLET | Refills: 3 | OUTPATIENT
Start: 2024-07-28

## 2024-07-28 RX ORDER — ATORVASTATIN CALCIUM 10 MG/1
10 TABLET, FILM COATED ORAL EVERY EVENING
Qty: 90 TABLET | Refills: 3 | OUTPATIENT
Start: 2024-07-28

## 2024-07-28 RX ORDER — ATORVASTATIN CALCIUM 10 MG/1
10 TABLET, FILM COATED ORAL EVERY EVENING
Qty: 90 TABLET | Refills: 0 | Status: SHIPPED | OUTPATIENT
Start: 2024-07-28

## 2024-07-28 RX ORDER — LISINOPRIL AND HYDROCHLOROTHIAZIDE 20; 12.5 MG/1; MG/1
2 TABLET ORAL DAILY
Qty: 180 TABLET | Refills: 3 | OUTPATIENT
Start: 2024-07-28

## 2024-07-28 RX ORDER — AMLODIPINE BESYLATE 10 MG/1
10 TABLET ORAL
Qty: 90 TABLET | Refills: 0 | Status: SHIPPED | OUTPATIENT
Start: 2024-07-28

## 2024-07-28 RX ORDER — LISINOPRIL AND HYDROCHLOROTHIAZIDE 20; 12.5 MG/1; MG/1
2 TABLET ORAL
Qty: 180 TABLET | Refills: 0 | Status: SHIPPED | OUTPATIENT
Start: 2024-07-28

## 2024-10-30 LAB
ALBUMIN SERPL-MCNC: 4.4 G/DL (ref 3.8–4.9)
ALP SERPL-CCNC: 74 IU/L (ref 44–121)
ALT SERPL-CCNC: 32 IU/L (ref 0–44)
AST SERPL-CCNC: 20 IU/L (ref 0–40)
BASOPHILS # BLD AUTO: 0.1 X10E3/UL (ref 0–0.2)
BASOPHILS NFR BLD AUTO: 1 %
BILIRUB SERPL-MCNC: 0.5 MG/DL (ref 0–1.2)
BUN SERPL-MCNC: 16 MG/DL (ref 6–24)
BUN/CREAT SERPL: 18 (ref 9–20)
CALCIUM SERPL-MCNC: 9.9 MG/DL (ref 8.7–10.2)
CHLORIDE SERPL-SCNC: 97 MMOL/L (ref 96–106)
CO2 SERPL-SCNC: 22 MMOL/L (ref 20–29)
CREAT SERPL-MCNC: 0.87 MG/DL (ref 0.76–1.27)
EGFRCR SERPLBLD CKD-EPI 2021: 101 ML/MIN/1.73
EOSINOPHIL # BLD AUTO: 0.3 X10E3/UL (ref 0–0.4)
EOSINOPHIL NFR BLD AUTO: 4 %
ERYTHROCYTE [DISTWIDTH] IN BLOOD BY AUTOMATED COUNT: 12.4 % (ref 11.6–15.4)
GLOBULIN SER CALC-MCNC: 2.7 G/DL (ref 1.5–4.5)
GLUCOSE SERPL-MCNC: 99 MG/DL (ref 70–99)
HBA1C MFR BLD: 6 % (ref 4.8–5.6)
HCT VFR BLD AUTO: 43.8 % (ref 37.5–51)
HGB BLD-MCNC: 15 G/DL (ref 13–17.7)
IMM GRANULOCYTES # BLD AUTO: 0 X10E3/UL (ref 0–0.1)
IMM GRANULOCYTES NFR BLD AUTO: 0 %
IMMATURE CELLS  115398: NORMAL
LYMPHOCYTES # BLD AUTO: 1.6 X10E3/UL (ref 0.7–3.1)
LYMPHOCYTES NFR BLD AUTO: 24 %
MCH RBC QN AUTO: 32.1 PG (ref 26.6–33)
MCHC RBC AUTO-ENTMCNC: 34.2 G/DL (ref 31.5–35.7)
MCV RBC AUTO: 94 FL (ref 79–97)
MONOCYTES # BLD AUTO: 0.6 X10E3/UL (ref 0.1–0.9)
MONOCYTES NFR BLD AUTO: 9 %
MORPHOLOGY BLD-IMP: NORMAL
NEUTROPHILS # BLD AUTO: 4.2 X10E3/UL (ref 1.4–7)
NEUTROPHILS NFR BLD AUTO: 62 %
NRBC BLD AUTO-RTO: NORMAL %
NT-PROBNP SERPL-MCNC: <36 PG/ML (ref 0–210)
PLATELET # BLD AUTO: 322 X10E3/UL (ref 150–450)
POTASSIUM SERPL-SCNC: 4 MMOL/L (ref 3.5–5.2)
PROT SERPL-MCNC: 7.1 G/DL (ref 6–8.5)
RBC # BLD AUTO: 4.67 X10E6/UL (ref 4.14–5.8)
SODIUM SERPL-SCNC: 136 MMOL/L (ref 134–144)
TSH SERPL DL<=0.005 MIU/L-ACNC: 1.33 UIU/ML (ref 0.45–4.5)
WBC # BLD AUTO: 6.7 X10E3/UL (ref 3.4–10.8)

## 2024-10-31 ENCOUNTER — APPOINTMENT (OUTPATIENT)
Dept: MEDICAL GROUP | Facility: MEDICAL CENTER | Age: 59
End: 2024-10-31
Payer: COMMERCIAL

## 2024-10-31 VITALS
WEIGHT: 294 LBS | HEART RATE: 86 BPM | TEMPERATURE: 97 F | SYSTOLIC BLOOD PRESSURE: 120 MMHG | DIASTOLIC BLOOD PRESSURE: 70 MMHG | OXYGEN SATURATION: 98 % | BODY MASS INDEX: 38.97 KG/M2 | HEIGHT: 73 IN

## 2024-10-31 DIAGNOSIS — R73.03 PREDIABETES: ICD-10-CM

## 2024-10-31 DIAGNOSIS — I10 ESSENTIAL HYPERTENSION: ICD-10-CM

## 2024-10-31 DIAGNOSIS — G47.33 OBSTRUCTIVE SLEEP APNEA: ICD-10-CM

## 2024-10-31 DIAGNOSIS — Z23 NEED FOR VACCINATION: ICD-10-CM

## 2024-10-31 DIAGNOSIS — Z82.49 FAMILY HISTORY OF HEART DISEASE: ICD-10-CM

## 2024-10-31 DIAGNOSIS — E55.9 VITAMIN D DEFICIENCY: ICD-10-CM

## 2024-10-31 DIAGNOSIS — Z00.00 ANNUAL PHYSICAL EXAM: ICD-10-CM

## 2024-10-31 DIAGNOSIS — E66.9 OBESITY (BMI 30-39.9): ICD-10-CM

## 2024-10-31 DIAGNOSIS — I70.90 ATHEROSCLEROTIC PLAQUE: ICD-10-CM

## 2024-10-31 DIAGNOSIS — E78.00 PURE HYPERCHOLESTEROLEMIA: ICD-10-CM

## 2024-10-31 PROBLEM — J06.9 VIRAL UPPER RESPIRATORY TRACT INFECTION: Status: RESOLVED | Noted: 2023-05-15 | Resolved: 2024-10-31

## 2024-10-31 PROCEDURE — 3074F SYST BP LT 130 MM HG: CPT | Performed by: NURSE PRACTITIONER

## 2024-10-31 PROCEDURE — 3078F DIAST BP <80 MM HG: CPT | Performed by: NURSE PRACTITIONER

## 2024-10-31 PROCEDURE — 99214 OFFICE O/P EST MOD 30 MIN: CPT | Mod: 25 | Performed by: NURSE PRACTITIONER

## 2024-10-31 RX ORDER — ATORVASTATIN CALCIUM 10 MG/1
10 TABLET, FILM COATED ORAL EVERY EVENING
Qty: 90 TABLET | Refills: 0 | Status: SHIPPED | OUTPATIENT
Start: 2024-10-31

## 2024-10-31 RX ORDER — AMLODIPINE BESYLATE 10 MG/1
10 TABLET ORAL
Qty: 90 TABLET | Refills: 3 | Status: SHIPPED | OUTPATIENT
Start: 2024-10-31

## 2024-10-31 RX ORDER — LISINOPRIL AND HYDROCHLOROTHIAZIDE 12.5; 2 MG/1; MG/1
2 TABLET ORAL
Qty: 180 TABLET | Refills: 3 | Status: SHIPPED | OUTPATIENT
Start: 2024-10-31

## 2024-10-31 ASSESSMENT — FIBROSIS 4 INDEX: FIB4 SCORE: 0.63

## 2024-11-01 PROCEDURE — 90656 IIV3 VACC NO PRSV 0.5 ML IM: CPT | Performed by: NURSE PRACTITIONER

## 2024-11-01 PROCEDURE — 90471 IMMUNIZATION ADMIN: CPT | Performed by: NURSE PRACTITIONER

## 2024-11-09 LAB
25(OH)D3+25(OH)D2 SERPL-MCNC: 41.8 NG/ML (ref 30–100)
ALBUMIN/CREAT UR: <16 MG/G CREAT (ref 0–29)
CHOLEST SERPL-MCNC: 147 MG/DL (ref 100–199)
CREAT UR-MCNC: 18.5 MG/DL
HCV IGG SERPL QL IA: NON REACTIVE
HDLC SERPL-MCNC: 45 MG/DL
HIV 1+2 AB+HIV1 P24 AG SERPL QL IA: NON REACTIVE
LDL CALC COMMENT:: NORMAL
LDLC SERPL CALC-MCNC: 85 MG/DL (ref 0–99)
MICROALBUMIN UR-MCNC: <3 UG/ML
PSA SERPL-MCNC: 0.6 NG/ML (ref 0–4)
TRIGL SERPL-MCNC: 87 MG/DL (ref 0–149)
VLDLC SERPL CALC-MCNC: 17 MG/DL (ref 5–40)

## 2024-11-13 ENCOUNTER — HOSPITAL ENCOUNTER (OUTPATIENT)
Dept: RADIOLOGY | Facility: MEDICAL CENTER | Age: 59
End: 2024-11-13
Attending: NURSE PRACTITIONER
Payer: COMMERCIAL

## 2024-11-13 DIAGNOSIS — E78.00 PURE HYPERCHOLESTEROLEMIA: ICD-10-CM

## 2024-11-13 PROCEDURE — 4410556 CT-CARDIAC SCORING (SELF PAY ONLY)

## 2024-11-18 NOTE — PROGRESS NOTES
Subjective:     History of Present Illness  The patient presents for annual, re    He is seeking refills for his medications, which include amlodipine 10 mg daily, atorvastatin 10 mg nightly, lisinopril/hydrochlorothiazide 2 tablets daily, baby aspirin, and glucosamine. He reports that glucosamine has significantly improved his arthritis symptoms.    He experiences pain in the heel of his foot upon waking up and after driving for extended periods, but this subsides once he starts moving. He also reports intermittent elbow pain, which he attributes to working on his trailer. This pain occasionally radiates to his fingers but resolves on its own. He only takes ibuprofen as needed.    He has been using a CPAP machine since December 2023, which has improved his sleep quality. He also uses Flonase at night to manage his sinuses, particularly during the summer months.    He reports no chest pain or shortness of breath during physical exertion. He plans to rejoin [employer] due to joint discomfort.    He has experienced intermittent acid reflux over the past two nights, which he attributes to dietary changes. His physical activity has decreased due to knee pain. He takes vitamin D daily.    FAMILY HISTORY  His father had heart disease in his late 60s and had bypass surgery. His mother never had a heart attack. Both parents had high blood pressure and were obese.      Current medicines (including changes today)  Current Outpatient Medications   Medication Sig Dispense Refill    amLODIPine (NORVASC) 10 MG Tab Take 1 Tablet by mouth every day. 90 Tablet 3    atorvastatin (LIPITOR) 10 MG Tab Take 1 Tablet by mouth every evening. 90 Tablet 0    lisinopril-hydrochlorothiazide (PRINZIDE) 20-12.5 MG per tablet Take 2 Tablets by mouth every day. 180 Tablet 3    aspirin (ASA) 81 MG Chew Tab chewable tablet Chew 81 mg every day.       No current facility-administered medications for this visit.     He  has a past medical history  "of Allergy, Anxiety, GERD (gastroesophageal reflux disease), Hyperlipidemia, and Hypertension.    He has no past medical history of Headache(784.0).    ROS   No chest pain, no shortness of breath, no abdominal pain  Positive ROS as per HPI.  All other systems reviewed and are negative.     Objective:     /70   Pulse 86   Temp 36.1 °C (97 °F) (Temporal)   Ht 1.854 m (6' 1\")   Wt (!) 133 kg (294 lb)   SpO2 98%  Body mass index is 38.79 kg/m².   Physical Exam    Constitutional: Alert, no distress.  Skin: Warm, dry, good turgor, no rashes in visible areas.  Eye: Equal, round and reactive, conjunctiva clear, lids normal.  ENMT: Lips without lesions, good dentition, oropharynx clear.  Neck: Trachea midline, no masses, no thyromegaly. No cervical or supraclavicular lymphadenopathy  Respiratory: Unlabored respiratory effort, lungs clear to auscultation, no wheezes, no ronchi.  Cardiovascular: Normal S1, S2, no murmur, no edema.  Psych: Alert and oriented x3, normal affect and mood.      Results          Assessment and Plan:   The following treatment plan was discussed    Assessment & Plan  1. Medication Management.  Prescriptions for amlodipine 10 mg daily, atorvastatin 10 mg nightly, and lisinopril-hydrochlorothiazide 2 tablets daily will be renewed. He also takes one baby aspirin daily and glucosamine for arthritis.    2. Plantar Fasciitis.  He reports pain in the heel of his foot, especially in the morning and after prolonged periods of inactivity. The pain improves with movement. Conservative management with stretching exercises and over-the-counter pain relief as needed is recommended.    3. Lateral Epicondylitis (Tennis Elbow).  He experiences pain in his elbow, particularly after repetitive activities. The pain radiates to his fingers but improves over time. Conservative management with rest, ice, and over-the-counter pain relief as needed is recommended.    4. Obstructive Sleep Apnea.  He started using a " CPAP machine in December and reports significant improvement in sleep quality. He will continue using the CPAP machine.    5. Gastroesophageal Reflux Disease (GERD).  He reports experiencing acid reflux at night over the past two nights. He is advised to avoid large meals before bedtime and consider dietary modifications. If symptoms persist, further evaluation and treatment may be necessary.    6. Health Maintenance.  Annual labs reviewed. An influenza vaccine will be administered today.     7. Hyperlipidemia  Stable on atorvastatin 10 mg daily  A CT coronary calcium score will be ordered. If the CT scan reveals significant plaque, a referral to Cardiology will be made.    8. Prediabetes  Unstable, improved  Continue working on diet, exercise, weight loss    9. Hypertension  Stable on lisinopril-hctz 40-25 mg and amlopine 10 mg daily          ORDERS:  1. Annual physical exam    2. Prediabetes    3. Essential hypertension  - amLODIPine (NORVASC) 10 MG Tab; Take 1 Tablet by mouth every day.  Dispense: 90 Tablet; Refill: 3  - lisinopril-hydrochlorothiazide (PRINZIDE) 20-12.5 MG per tablet; Take 2 Tablets by mouth every day.  Dispense: 180 Tablet; Refill: 3    4. Pure hypercholesterolemia  - atorvastatin (LIPITOR) 10 MG Tab; Take 1 Tablet by mouth every evening.  Dispense: 90 Tablet; Refill: 0  - CT-CARDIAC SCORING; Future    5. Obstructive sleep apnea    6. Atherosclerotic plaque  - atorvastatin (LIPITOR) 10 MG Tab; Take 1 Tablet by mouth every evening.  Dispense: 90 Tablet; Refill: 0    7. Family history of heart disease    - atorvastatin (LIPITOR) 10 MG Tab; Take 1 Tablet by mouth every evening.  Dispense: 90 Tablet; Refill: 0    8. Obesity (BMI 30-39.9)    9. Vitamin D deficiency    10. Need for vaccination  - INFLUENZA VACCINE TRI INJ (PF)          The MA is performing the above orders under the direction of Dr. Ybarra    Please note that this dictation was created using voice recognition software. I have made  every reasonable attempt to correct obvious errors, but I expect that there are errors of grammar and possibly content that I did not discover before finalizing the note.      Attestation      Verbal consent was acquired by the patient to use [employer] ambient listening note generation during this visit Yes

## 2025-02-24 ENCOUNTER — APPOINTMENT (OUTPATIENT)
Dept: MEDICAL GROUP | Facility: MEDICAL CENTER | Age: 60
End: 2025-02-24
Payer: COMMERCIAL

## 2025-04-29 DIAGNOSIS — Z82.49 FAMILY HISTORY OF HEART DISEASE: ICD-10-CM

## 2025-04-29 DIAGNOSIS — I70.90 ATHEROSCLEROTIC PLAQUE: ICD-10-CM

## 2025-04-29 DIAGNOSIS — E78.00 PURE HYPERCHOLESTEROLEMIA: ICD-10-CM

## 2025-04-29 RX ORDER — ATORVASTATIN CALCIUM 10 MG/1
10 TABLET, FILM COATED ORAL EVERY EVENING
Qty: 90 TABLET | Refills: 0 | Status: SHIPPED | OUTPATIENT
Start: 2025-04-29

## 2025-04-30 NOTE — TELEPHONE ENCOUNTER
Refill done. Please call and schedule patient for appointment to review his CT, ok to use end of day doc block.   CATHLEEN Veronica.

## 2025-07-25 DIAGNOSIS — E78.00 PURE HYPERCHOLESTEROLEMIA: ICD-10-CM

## 2025-07-25 DIAGNOSIS — I70.90 ATHEROSCLEROTIC PLAQUE: ICD-10-CM

## 2025-07-25 DIAGNOSIS — Z82.49 FAMILY HISTORY OF HEART DISEASE: ICD-10-CM

## 2025-07-25 RX ORDER — ATORVASTATIN CALCIUM 10 MG/1
10 TABLET, FILM COATED ORAL EVERY EVENING
Qty: 90 TABLET | Refills: 0 | Status: SHIPPED | OUTPATIENT
Start: 2025-07-25

## 2025-07-25 NOTE — PROGRESS NOTES
Pure hypercholesterolemia  -     atorvastatin (LIPITOR) 10 MG Tab; Take 1 Tablet by mouth every evening.    Atherosclerotic plaque  -     atorvastatin (LIPITOR) 10 MG Tab; Take 1 Tablet by mouth every evening.    Family history of heart disease  -     atorvastatin (LIPITOR) 10 MG Tab; Take 1 Tablet by mouth every evening.

## 2025-09-29 ENCOUNTER — APPOINTMENT (OUTPATIENT)
Dept: MEDICAL GROUP | Facility: IMAGING CENTER | Age: 60
End: 2025-09-29
Payer: COMMERCIAL